# Patient Record
Sex: FEMALE | Race: WHITE | NOT HISPANIC OR LATINO | Employment: UNEMPLOYED | URBAN - METROPOLITAN AREA
[De-identification: names, ages, dates, MRNs, and addresses within clinical notes are randomized per-mention and may not be internally consistent; named-entity substitution may affect disease eponyms.]

---

## 2017-07-28 ENCOUNTER — HOSPITAL ENCOUNTER (OUTPATIENT)
Dept: FAMILY MEDICINE CLINIC | Facility: CLINIC | Age: 37
Setting detail: SPECIMEN
Discharge: HOME | End: 2017-07-28
Attending: NURSE PRACTITIONER | Admitting: NURSE PRACTITIONER

## 2017-07-28 LAB
CHROMATIN AB SERPL-ACNC: 24 [IU]/ML (ref 0–20)
CRP SERPL-MCNC: 0.1 MG/DL (ref 0–0.7)
ERYTHROCYTE [SEDIMENTATION RATE] IN BLOOD BY WESTERGREN METHOD: 23 MM/HR (ref 0–20)

## 2017-07-31 LAB
ANA SER QL IA: NORMAL

## 2018-01-10 ENCOUNTER — CONVERSION ENCOUNTER (OUTPATIENT)
Dept: FAMILY MEDICINE CLINIC | Facility: CLINIC | Age: 38
End: 2018-01-10

## 2018-01-10 LAB
ALBUMIN SERPL-MCNC: 4.2 G/DL (ref 3.6–5.1)
ALBUMIN/GLOB SERPL: ABNORMAL {RATIO} (ref 1–2.5)
ALP SERPL-CCNC: 78 UNITS/L (ref 33–115)
ALT SERPL-CCNC: 12 UNITS/L (ref 6–29)
AST SERPL-CCNC: 15 UNITS/L (ref 10–30)
BASOPHILS # BLD AUTO: ABNORMAL 10*3/MM3 (ref 0–200)
BASOPHILS NFR BLD AUTO: 0.7 %
BILIRUB SERPL-MCNC: 0.5 MG/DL (ref 0.2–1.2)
BILIRUB UR QL STRIP: NEGATIVE
BUN SERPL-MCNC: 18 MG/DL (ref 7–25)
BUN/CREAT SERPL: ABNORMAL (ref 6–22)
CALCIUM SERPL-MCNC: 9.3 MG/DL (ref 8.6–10.2)
CHLORIDE SERPL-SCNC: 105 MMOL/L (ref 98–110)
CO2 CONTENT VENOUS: 31 MMOL/L (ref 20–31)
COLOR UR: YELLOW
CONV BACTERIA IN URINE MICRO: ABNORMAL /HPF
CONV COMMENT: 0.02
CONV HYALINE CASTS IN URINE MICRO: ABNORMAL
CONV NEUTROPHILS/100 LEUKOCYTES IN BODY FLUID BY MANUAL COUNT: 59.8 %
CONV PROTEIN IN URINE BY AUTOMATED TEST STRIP: NEGATIVE
CONV TOTAL PROTEIN: 6.8 G/DL (ref 6.1–8.1)
CREAT UR-MCNC: 0.63 MG/DL (ref 0.5–1.1)
CRP SERPL-MCNC: 0.04 MG/DL
EOSINOPHIL # BLD AUTO: 1.4 %
EOSINOPHIL # BLD AUTO: ABNORMAL 10*3/MM3 (ref 15–500)
ERYTHROCYTE [DISTWIDTH] IN BLOOD BY AUTOMATED COUNT: 12.2 % (ref 11–15)
ERYTHROCYTE [SEDIMENTATION RATE] IN BLOOD BY WESTERGREN METHOD: 6 MM/HR
GLOBULIN UR ELPH-MCNC: ABNORMAL G/DL (ref 1.9–3.7)
GLUCOSE SERPL-MCNC: 72 MG/DL (ref 65–99)
GLUCOSE UR QL: NEGATIVE G/DL
HBV CORE AB SER DONR QL IA: ABNORMAL
HBV CORE AB SER DONR QL IA: REACTIVE
HBV SURFACE AB SER QL: ABNORMAL
HBV SURFACE AG SERPL QL IA: ABNORMAL
HCT VFR BLD AUTO: 35.1 % (ref 35–45)
HCV AB SER DONR QL: ABNORMAL
HGB BLD-MCNC: 12.4 G/DL (ref 11.7–15.5)
HGB UR QL STRIP: NEGATIVE
KETONES UR QL STRIP: NEGATIVE
LEUKOCYTE ESTERASE UR QL STRIP: NEGATIVE
LYMPHOCYTES # BLD AUTO: ABNORMAL 10*3/MM3 (ref 850–3900)
LYMPHOCYTES NFR BLD AUTO: 29.2 %
MCH RBC QN AUTO: 33.8 PG (ref 27–33)
MCHC RBC AUTO-ENTMCNC: ABNORMAL % (ref 32–36)
MCV RBC AUTO: 95.6 FL (ref 80–100)
MONOCYTES # BLD AUTO: ABNORMAL 10*3/MICROLITER (ref 200–950)
MONOCYTES NFR BLD AUTO: 8.9 %
NEUTROPHILS # BLD AUTO: ABNORMAL 10*3/MM3 (ref 1500–7800)
NITRITE UR QL STRIP: NEGATIVE
PH UR STRIP.AUTO: 8 [PH] (ref 5–8)
PLATELET # BLD AUTO: ABNORMAL 10*3/MM3 (ref 140–400)
PMV BLD AUTO: 11.1 FL (ref 7.5–12.5)
POTASSIUM SERPL-SCNC: 4.1 MMOL/L (ref 3.5–5.3)
RBC # BLD AUTO: ABNORMAL 10*6/MM3 (ref 3.8–5.1)
RBC #/AREA URNS HPF: ABNORMAL /[HPF]
SODIUM SERPL-SCNC: 139 MMOL/L (ref 135–146)
SP GR UR: 1.02 (ref 1–1.03)
SQUAMOUS #/AREA URNS HPF: ABNORMAL /HPF
WBC # BLD AUTO: ABNORMAL K/UL (ref 3.8–10.8)
WBC #/AREA URNS HPF: ABNORMAL CELLS/HPF

## 2018-01-31 LAB — HAV IGM SERPL QL IA: NORMAL

## 2018-08-29 LAB
ALBUMIN SERPL-MCNC: 4.2 G/DL (ref 3.6–5.1)
ALBUMIN/GLOB SERPL: ABNORMAL {RATIO} (ref 1–2.5)
ALP SERPL-CCNC: 76 UNITS/L (ref 33–115)
ALT SERPL-CCNC: 10 UNITS/L (ref 6–29)
AST SERPL-CCNC: 16 UNITS/L (ref 10–30)
BASOPHILS # BLD AUTO: ABNORMAL 10*3/MM3 (ref 0–200)
BASOPHILS NFR BLD AUTO: 0.7 %
BILIRUB SERPL-MCNC: 0.6 MG/DL (ref 0.2–1.2)
BILIRUB UR QL STRIP: NEGATIVE
BUN SERPL-MCNC: 12 MG/DL (ref 7–25)
BUN/CREAT SERPL: ABNORMAL (ref 6–22)
CALCIUM SERPL-MCNC: 9.4 MG/DL (ref 8.6–10.2)
CHLORIDE SERPL-SCNC: 103 MMOL/L (ref 98–110)
CO2 CONTENT VENOUS: 32 MMOL/L (ref 20–32)
COLOR UR: YELLOW
CONV BACTERIA IN URINE MICRO: ABNORMAL /HPF
CONV HYALINE CASTS IN URINE MICRO: ABNORMAL
CONV NEUTROPHILS/100 LEUKOCYTES IN BODY FLUID BY MANUAL COUNT: 64.9 %
CONV PROTEIN IN URINE BY AUTOMATED TEST STRIP: NEGATIVE
CONV TOTAL PROTEIN: 7.1 G/DL (ref 6.1–8.1)
CREAT UR-MCNC: 0.61 MG/DL (ref 0.5–1.1)
CRP SERPL-MCNC: 0.04 MG/DL
EOSINOPHIL # BLD AUTO: 2.6 %
EOSINOPHIL # BLD AUTO: ABNORMAL 10*3/MM3 (ref 15–500)
ERYTHROCYTE [DISTWIDTH] IN BLOOD BY AUTOMATED COUNT: 12.6 % (ref 11–15)
ERYTHROCYTE [SEDIMENTATION RATE] IN BLOOD BY WESTERGREN METHOD: 22 MM/HR
GLOBULIN UR ELPH-MCNC: ABNORMAL G/DL (ref 1.9–3.7)
GLUCOSE SERPL-MCNC: 74 MG/DL (ref 65–99)
GLUCOSE UR QL: NEGATIVE G/DL
HCT VFR BLD AUTO: 37.9 % (ref 35–45)
HGB BLD-MCNC: 12.7 G/DL (ref 11.7–15.5)
HGB UR QL STRIP: NEGATIVE
KETONES UR QL STRIP: NEGATIVE
LEUKOCYTE ESTERASE UR QL STRIP: NEGATIVE
LYMPHOCYTES # BLD AUTO: ABNORMAL 10*3/MM3 (ref 850–3900)
LYMPHOCYTES NFR BLD AUTO: 25.6 %
MCH RBC QN AUTO: 32.8 PG (ref 27–33)
MCHC RBC AUTO-ENTMCNC: ABNORMAL % (ref 32–36)
MCV RBC AUTO: 97.9 FL (ref 80–100)
MONOCYTES # BLD AUTO: ABNORMAL 10*3/MICROLITER (ref 200–950)
MONOCYTES NFR BLD AUTO: 6.2 %
NEUTROPHILS # BLD AUTO: ABNORMAL 10*3/MM3 (ref 1500–7800)
NITRITE UR QL STRIP: NEGATIVE
PH UR STRIP.AUTO: 7.5 [PH] (ref 5–8)
PLATELET # BLD AUTO: ABNORMAL 10*3/MM3 (ref 140–400)
PMV BLD AUTO: 11.1 FL (ref 7.5–12.5)
POTASSIUM SERPL-SCNC: 3.9 MMOL/L (ref 3.5–5.3)
RBC # BLD AUTO: ABNORMAL 10*6/MM3 (ref 3.8–5.1)
RBC #/AREA URNS HPF: ABNORMAL /[HPF]
SODIUM SERPL-SCNC: 138 MMOL/L (ref 135–146)
SP GR UR: 1 (ref 1–1.03)
SQUAMOUS #/AREA URNS HPF: ABNORMAL /HPF
WBC # BLD AUTO: ABNORMAL K/UL (ref 3.8–10.8)
WBC #/AREA URNS HPF: ABNORMAL CELLS/HPF

## 2018-10-15 ENCOUNTER — HOSPITAL ENCOUNTER (OUTPATIENT)
Dept: FAMILY MEDICINE CLINIC | Facility: CLINIC | Age: 38
Setting detail: SPECIMEN
Discharge: HOME | End: 2018-10-15
Attending: FAMILY MEDICINE | Admitting: FAMILY MEDICINE

## 2018-10-15 LAB
T4 FREE SERPL-MCNC: 0.75 NG/DL (ref 0.58–1.64)
TSH SERPL-ACNC: 1.49 UIU/ML (ref 0.34–5.6)

## 2018-12-04 LAB
ALBUMIN SERPL-MCNC: 4.2 G/DL (ref 3.6–5.1)
ALBUMIN/GLOB SERPL: ABNORMAL {RATIO} (ref 1–2.5)
ALP SERPL-CCNC: 57 UNITS/L (ref 33–115)
ALT SERPL-CCNC: 11 UNITS/L (ref 6–29)
AST SERPL-CCNC: 16 UNITS/L (ref 10–30)
BASOPHILS # BLD AUTO: ABNORMAL 10*3/MM3 (ref 0–200)
BASOPHILS NFR BLD AUTO: 0.8 %
BILIRUB SERPL-MCNC: 0.7 MG/DL (ref 0.2–1.2)
BILIRUB UR QL STRIP: NEGATIVE
BUN SERPL-MCNC: 14 MG/DL (ref 7–25)
BUN/CREAT SERPL: ABNORMAL (ref 6–22)
CALCIUM SERPL-MCNC: 8.9 MG/DL (ref 8.6–10.2)
CHLORIDE SERPL-SCNC: 106 MMOL/L (ref 98–110)
CO2 CONTENT VENOUS: 24 MMOL/L (ref 20–32)
COLOR UR: YELLOW
CONV BACTERIA IN URINE MICRO: ABNORMAL /HPF
CONV HYALINE CASTS IN URINE MICRO: ABNORMAL
CONV NEUTROPHILS/100 LEUKOCYTES IN BODY FLUID BY MANUAL COUNT: 54.6 %
CONV PROTEIN IN URINE BY AUTOMATED TEST STRIP: NEGATIVE
CONV TOTAL PROTEIN: 6.7 G/DL (ref 6.1–8.1)
CREAT UR-MCNC: 0.63 MG/DL (ref 0.5–1.1)
CRP SERPL-MCNC: 0.02 MG/DL
EOSINOPHIL # BLD AUTO: 4 %
EOSINOPHIL # BLD AUTO: ABNORMAL 10*3/MM3 (ref 15–500)
ERYTHROCYTE [DISTWIDTH] IN BLOOD BY AUTOMATED COUNT: 12 % (ref 11–15)
ERYTHROCYTE [SEDIMENTATION RATE] IN BLOOD BY WESTERGREN METHOD: 6 MM/HR
GLOBULIN UR ELPH-MCNC: ABNORMAL G/DL (ref 1.9–3.7)
GLUCOSE SERPL-MCNC: 79 MG/DL (ref 65–139)
GLUCOSE UR QL: NEGATIVE G/DL
HCT VFR BLD AUTO: 33.8 % (ref 35–45)
HGB BLD-MCNC: 11.7 G/DL (ref 11.7–15.5)
HGB UR QL STRIP: NEGATIVE
KETONES UR QL STRIP: NEGATIVE
LEUKOCYTE ESTERASE UR QL STRIP: NEGATIVE
LYMPHOCYTES # BLD AUTO: ABNORMAL 10*3/MM3 (ref 850–3900)
LYMPHOCYTES NFR BLD AUTO: 34.2 %
MCH RBC QN AUTO: 32.8 PG (ref 27–33)
MCHC RBC AUTO-ENTMCNC: ABNORMAL % (ref 32–36)
MCV RBC AUTO: 94.7 FL (ref 80–100)
MONOCYTES # BLD AUTO: ABNORMAL 10*3/MICROLITER (ref 200–950)
MONOCYTES NFR BLD AUTO: 6.4 %
NEUTROPHILS # BLD AUTO: ABNORMAL 10*3/MM3 (ref 1500–7800)
NITRITE UR QL STRIP: NEGATIVE
PH UR STRIP.AUTO: 7 [PH] (ref 5–8)
PLATELET # BLD AUTO: ABNORMAL 10*3/MM3 (ref 140–400)
PMV BLD AUTO: 11.8 FL (ref 7.5–12.5)
POTASSIUM SERPL-SCNC: 3.9 MMOL/L (ref 3.5–5.3)
RBC # BLD AUTO: ABNORMAL 10*6/MM3 (ref 3.8–5.1)
RBC #/AREA URNS HPF: ABNORMAL /[HPF]
SODIUM SERPL-SCNC: 139 MMOL/L (ref 135–146)
SP GR UR: 1.01 (ref 1–1.03)
SQUAMOUS #/AREA URNS HPF: ABNORMAL /HPF
WBC # BLD AUTO: ABNORMAL K/UL (ref 3.8–10.8)
WBC #/AREA URNS HPF: ABNORMAL CELLS/HPF

## 2018-12-13 ENCOUNTER — HOSPITAL ENCOUNTER (OUTPATIENT)
Dept: OTHER | Facility: HOSPITAL | Age: 38
Setting detail: SPECIMEN
Discharge: HOME | End: 2018-12-13
Attending: SURGERY | Admitting: SURGERY

## 2019-01-16 ENCOUNTER — HOSPITAL ENCOUNTER (OUTPATIENT)
Dept: FAMILY MEDICINE CLINIC | Facility: CLINIC | Age: 39
Setting detail: SPECIMEN
Discharge: HOME | End: 2019-01-16
Attending: FAMILY MEDICINE | Admitting: FAMILY MEDICINE

## 2019-01-17 LAB
FOLATE SERPL-MCNC: 17.2 NG/ML
VIT B12 SERPL-MCNC: 895 PG/ML (ref 200–1100)

## 2019-03-08 ENCOUNTER — HOSPITAL ENCOUNTER (OUTPATIENT)
Dept: LAB | Facility: HOSPITAL | Age: 39
Setting detail: SPECIMEN
Discharge: HOME | End: 2019-03-08
Attending: INTERNAL MEDICINE | Admitting: INTERNAL MEDICINE

## 2019-04-11 ENCOUNTER — HOSPITAL ENCOUNTER (OUTPATIENT)
Dept: LAB | Facility: HOSPITAL | Age: 39
Discharge: HOME | End: 2019-04-11
Attending: OBSTETRICS & GYNECOLOGY | Admitting: OBSTETRICS & GYNECOLOGY

## 2019-04-11 LAB
ABO + RH BLD: NORMAL
ALBUMIN SERPL-MCNC: 3.9 G/DL (ref 3.5–4.8)
ALBUMIN/GLOB SERPL: 1.6 {RATIO} (ref 1–1.7)
ALP SERPL-CCNC: 57 IU/L (ref 32–91)
ALT SERPL-CCNC: 14 IU/L (ref 14–54)
ANION GAP SERPL CALC-SCNC: 12.7 MMOL/L (ref 10–20)
ARMBAND: NORMAL
AST SERPL-CCNC: 19 IU/L (ref 15–41)
BASOPHILS # BLD AUTO: 0.1 10*3/UL (ref 0–0.2)
BASOPHILS NFR BLD AUTO: 1 % (ref 0–2)
BILIRUB SERPL-MCNC: 1 MG/DL (ref 0.3–1.2)
BLD COMPONENT TYPE: NORMAL
BLD GP AB SCN SERPL QL: NEGATIVE
BUN SERPL-MCNC: 11 MG/DL (ref 8–20)
BUN/CREAT SERPL: 12.2 (ref 5.4–26.2)
CALCIUM SERPL-MCNC: 9.1 MG/DL (ref 8.9–10.3)
CHLORIDE SERPL-SCNC: 100 MMOL/L (ref 101–111)
CONV CO2: 26 MMOL/L (ref 22–32)
CONV TOTAL PROTEIN: 6.4 G/DL (ref 6.1–7.9)
CREAT UR-MCNC: 0.9 MG/DL (ref 0.4–1)
CROSSMATCH EXPIRATION: NORMAL
DIFFERENTIAL METHOD BLD: (no result)
EOSINOPHIL # BLD AUTO: 0.1 10*3/UL (ref 0–0.3)
EOSINOPHIL # BLD AUTO: 2 % (ref 0–3)
ERYTHROCYTE [DISTWIDTH] IN BLOOD BY AUTOMATED COUNT: 13.7 % (ref 11.5–14.5)
GLOBULIN UR ELPH-MCNC: 2.5 G/DL (ref 2.5–3.8)
GLUCOSE SERPL-MCNC: 74 MG/DL (ref 65–99)
HCT VFR BLD AUTO: 35.6 % (ref 35–49)
HGB BLD-MCNC: 12.2 G/DL (ref 12–15)
LYMPHOCYTES # BLD AUTO: 1.9 10*3/UL (ref 0.8–4.8)
LYMPHOCYTES NFR BLD AUTO: 31 % (ref 18–42)
MCH RBC QN AUTO: 33.8 PG (ref 26–32)
MCHC RBC AUTO-ENTMCNC: 34.2 G/DL (ref 32–36)
MCV RBC AUTO: 98.7 FL (ref 80–94)
MONOCYTES # BLD AUTO: 0.6 10*3/UL (ref 0.1–1.3)
MONOCYTES NFR BLD AUTO: 10 % (ref 2–11)
NEUTROPHILS # BLD AUTO: 3.4 10*3/UL (ref 2.3–8.6)
NEUTROPHILS NFR BLD AUTO: 56 % (ref 50–75)
NRBC BLD AUTO-RTO: 0 /100{WBCS}
NRBC/RBC NFR BLD MANUAL: 0 10*3/UL
PLATELET # BLD AUTO: 186 10*3/UL (ref 150–450)
PMV BLD AUTO: 9.4 FL (ref 7.4–10.4)
POTASSIUM SERPL-SCNC: 3.7 MMOL/L (ref 3.6–5.1)
RBC # BLD AUTO: 3.6 10*6/UL (ref 4–5.4)
SODIUM SERPL-SCNC: 135 MMOL/L (ref 136–144)
WBC # BLD AUTO: 6.1 10*3/UL (ref 4.5–11.5)

## 2019-04-18 ENCOUNTER — HOSPITAL ENCOUNTER (OUTPATIENT)
Dept: OTHER | Facility: HOSPITAL | Age: 39
Setting detail: SPECIMEN
Discharge: HOME | End: 2019-04-18
Attending: OBSTETRICS & GYNECOLOGY | Admitting: OBSTETRICS & GYNECOLOGY

## 2019-04-19 ENCOUNTER — HOSPITAL ENCOUNTER (OUTPATIENT)
Dept: OTHER | Facility: HOSPITAL | Age: 39
Setting detail: SPECIMEN
Discharge: HOME | End: 2019-04-19
Attending: OBSTETRICS & GYNECOLOGY | Admitting: OBSTETRICS & GYNECOLOGY

## 2019-04-19 LAB
BASOPHILS # BLD AUTO: 0 10*3/UL (ref 0–0.2)
BASOPHILS NFR BLD AUTO: 0 % (ref 0–2)
DIFFERENTIAL METHOD BLD: (no result)
EOSINOPHIL # BLD AUTO: 0.1 10*3/UL (ref 0–0.3)
EOSINOPHIL # BLD AUTO: 1 % (ref 0–3)
ERYTHROCYTE [DISTWIDTH] IN BLOOD BY AUTOMATED COUNT: 13.6 % (ref 11.5–14.5)
HCT VFR BLD AUTO: 33.4 % (ref 35–49)
HGB BLD-MCNC: 11.6 G/DL (ref 12–15)
LYMPHOCYTES # BLD AUTO: 2.3 10*3/UL (ref 0.8–4.8)
LYMPHOCYTES NFR BLD AUTO: 23 % (ref 18–42)
MCH RBC QN AUTO: 34.5 PG (ref 26–32)
MCHC RBC AUTO-ENTMCNC: 34.8 G/DL (ref 32–36)
MCV RBC AUTO: 99.1 FL (ref 80–94)
MONOCYTES # BLD AUTO: 0.7 10*3/UL (ref 0.1–1.3)
MONOCYTES NFR BLD AUTO: 7 % (ref 2–11)
NEUTROPHILS # BLD AUTO: 7 10*3/UL (ref 2.3–8.6)
NEUTROPHILS NFR BLD AUTO: 69 % (ref 50–75)
NRBC BLD AUTO-RTO: 0 /100{WBCS}
NRBC/RBC NFR BLD MANUAL: 0 10*3/UL
PLATELET # BLD AUTO: 122 10*3/UL (ref 150–450)
PMV BLD AUTO: 10 FL (ref 7.4–10.4)
RBC # BLD AUTO: 3.37 10*6/UL (ref 4–5.4)
WBC # BLD AUTO: 10.2 10*3/UL (ref 4.5–11.5)

## 2019-04-26 ENCOUNTER — HOSPITAL ENCOUNTER (OUTPATIENT)
Dept: OTHER | Facility: HOSPITAL | Age: 39
Discharge: HOME | End: 2019-04-26
Attending: NURSE PRACTITIONER | Admitting: NURSE PRACTITIONER

## 2019-05-21 ENCOUNTER — CONVERSION ENCOUNTER (OUTPATIENT)
Dept: FAMILY MEDICINE CLINIC | Facility: CLINIC | Age: 39
End: 2019-05-21

## 2019-05-21 LAB
C3 SERPL-MCNC: 95 MG/DL (ref 83–193)
C4 SERPL-MCNC: 17 MG/DL (ref 15–57)

## 2019-06-03 ENCOUNTER — CONVERSION ENCOUNTER (OUTPATIENT)
Dept: FAMILY MEDICINE CLINIC | Facility: CLINIC | Age: 39
End: 2019-06-03

## 2019-06-04 VITALS
HEART RATE: 67 BPM | WEIGHT: 143 LBS | HEIGHT: 69 IN | DIASTOLIC BLOOD PRESSURE: 66 MMHG | SYSTOLIC BLOOD PRESSURE: 101 MMHG | BODY MASS INDEX: 21.18 KG/M2

## 2019-06-06 NOTE — PROGRESS NOTES
Visit Type:  Follow-up Visit  Referring Provider:  Lilo Nettles MD  Primary Provider:  Yoon CARRERA MD    CC:  wrist pain.    History of Present Illness:    The patient is a 38-year-old female with CTD coming today in follow-up.  She was last in the office December 2018, that time, the patient was recommended to restart Plaquenil 200 mg p.o. daily.  She did not follow this recommendation, she says that she   has not been  comparable taking medications.    On today's visit, she reports pain that is rated 2/10, that is present in her Rt.  Wrist, she does not have major morning stiffness, she continues to have dry eyes and dry mouth, she has sores in her mouth and sometimes in her nose on occasion.  She has   skin rashes especially in her anterior upper chest after sun exposure, they do not last long.  Denies major episodes of Raynaud's.  She has occasional tenderness in the  Rt. pre-auricular area  On and off that is associated with stress. She had dysuria,   this morning she was seen by her OBGYN doctor and had a UC, there were no signs of urinary tract infection as per day UA.    Since last time I saw her, she had a hysterectomy for management of adenomyosis.  He hospital couse was complicated with superficial venous thrombosis on the LFUE.  I recommended to her to use aspirin due to her (+) anti phosphatase prothrombin IgM that   was double the upper limit of normal.  Initially we discussed about  using Lovenox she was not agreeble. She did not follow recommendations.      her laboratories today show normal Cr, liver function tests, normal complement levels, ESR 2, UA with (+) for occult blood and (+) for leukocyte esterase, had another UC on repeated at her OBGYN so office, CBC normal, CRP 0.7.            Rheumatologic history:  1. (+) RF, arthralgias  Vectra DA 27 01/2018  Ultrasound of the hands  01/2018 (-) for active inflammation    2. CTD based on  LUCAS 1:320 (Had strong postive antithyroglobulin)  with homogeneous pattern, Raynuds, oral nasal ulcers, photosensitive rash, (+) RF, srthalgias with no arthritis.  Plaquenil recommended 9/18, pt preferred to defer.    3. CTS bilaterally    4. Raynaud's    5. 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   superficial  thrombosis of the Rt.  Upper extremity after hysterectomy          Past Medical History:     Reviewed history from 03/08/2019 and no changes required:        Anemia        depression        thyroid                 tdap-2016        pap-2016    Past Surgical History:     Reviewed history from 03/08/2019 and no changes required:        HERNIA         3 - C-SECTIONS        bilateral tubal ligation-2016        Laproscopic hernia repair         2 foot Surgery        Breast Incisonal Biopsy     Family History Summary:      Reviewed history Last on 03/08/2019 and no changes required:06/03/2019      General Comments - FH:  FH MOTHER / PASSED AWAY WHEN SHE WAS YOUNG   FH CIRRHOSA OF THE LIVER / FATHER  ALCHOL  FH Heart Diosease Gradfather   FH Stroke Gradnmotjher   FH Cholesterol Grandmother  FH Cholesterol Grandmother   FH Thyroid gradnmother / Aunt  FH Cancer Uncle       Social History:     Reviewed history from 03/08/2019 and no changes required:        Patient is a former smoker.        Passive Smoke: N        Alcohol Use: Y        Drug Use: N        HIV/High Risk: N        Regular Exercise: N        Hx Domestic Abuse: N        Confucianist Affecting Care: N            Social History Summary:  Patient is a former smoker.  Passive Smoke: N  Alcohol Use: Y  Drug Use: N  HIV/High Risk: N  Regular Exercise: N  Hx Domestic Abuse: N  Confucianist Affecting Care: N  Social History Reviewed: 06/03/2019          Risk Factors:     Smoked Tobacco Use:  Former smoker     Cigarettes:  Yes  Smokeless Tobacco Use:  Never     Tobacco Use Comments:  QUIT 10 YEARS AGO  Passive smoke exposure:  no  Drug use:  no  HIV high-risk behavior:  no  Caffeine use:   1 drinks per day  Alcohol use:  yes     Drinks per day:  social     Counseled to quit/cut down alcohol use:  yes  Exercise:  no  Seatbelt use:  100 %  Sun Exposure:  occasionally    Previous Tobacco Use: Signed On - 03/08/2019  Smoked Tobacco Use:  Former smoker     Cigarettes:  Yes  Smokeless Tobacco Use:  Never     Tobacco Use Comments:  QUIT 10 YEARS AGO  Passive smoke exposure:  no  Drug use:  no  HIV high-risk behavior:  no  Caffeine use:  1 drinks per day    Previous Alcohol Use: Signed On - 03/08/2019  Alcohol use:  yes     Drinks per day:  social     Counseled to quit/cut down alcohol use:  yes  Exercise:  no  Seatbelt use:  100 %  Sun Exposure:  occasionally    Mammogram History:     Date of Last Mammogram:  07/03/2017    PAP Smear History:     Date of Last PAP Smear:  01/01/2018    Active Medications (reviewed today):  HYDROXYZINE HCL 25 MG ORAL TABLET (HYDROXYZINE HCL) 1 tab q6h prn anxiety  VISTARIL 25 MG ORAL CAPSULE (HYDROXYZINE PAMOATE) 1 tab q6h prn anxiety  ARMOUR THYROID 15 MG ORAL TABLET (THYROID) take 1 tablet by mouth daily  LEXAPRO 20 MG ORAL TABLET (ESCITALOPRAM OXALATE) 1 tab daily  CYCLOBENZAPRINE HCL 5 MG ORAL TABLET (CYCLOBENZAPRINE HCL) 1-2 tabs at night prn  ADDERALL 10 MG ORAL TABLET (AMPHETAMINE-DEXTROAMPHETAMINE) 1 tab bid  BUSPIRONE HCL 10 MG TABLET (BUSPIRONE HCL) TAKE 1 TABLET BY MOUTH EVERY 6 HOURS AS NEEDED FOR ANXIETY  RHINOCORT ALLERGY 32 MCG/ACT NASAL SUSPENSION (BUDESONIDE) as needed  MULTIVITAMINS TABS (MULTIPLE VITAMIN) Take one by mouth daily    Current Allergies (reviewed today):  AMOXICILLIN (Critical)  * LEVAQUIN (Moderate)    Current Medications (including medications started today):   HYDROXYZINE HCL 25 MG ORAL TABLET (HYDROXYZINE HCL) 1 tab q6h prn anxiety  VISTARIL 25 MG ORAL CAPSULE (HYDROXYZINE PAMOATE) 1 tab q6h prn anxiety  ARMOUR THYROID 15 MG ORAL TABLET (THYROID) take 1 tablet by mouth daily  LEXAPRO 20 MG ORAL TABLET (ESCITALOPRAM OXALATE) 1 tab  daily  CYCLOBENZAPRINE HCL 5 MG ORAL TABLET (CYCLOBENZAPRINE HCL) 1-2 tabs at night prn  ADDERALL 10 MG ORAL TABLET (AMPHETAMINE-DEXTROAMPHETAMINE) 1 tab bid  BUSPIRONE HCL 10 MG TABLET (BUSPIRONE HCL) TAKE 1 TABLET BY MOUTH EVERY 6 HOURS AS NEEDED FOR ANXIETY  RHINOCORT ALLERGY 32 MCG/ACT NASAL SUSPENSION (BUDESONIDE) as needed  MULTIVITAMINS TABS (MULTIPLE VITAMIN) Take one by mouth daily      Review of Systems        See HPI      Vital Signs:    Patient Profile:    38 Years Old Female  Height:     69 inches (175.26 cm)  Weight:     143 pounds (64.86 kg)  BMI:        21.12     BSA:        1.79  Pulse rate: 67 / minute  BP Sittin / 66  (left arm)    Cuff size:  regular  Patient has a risk of falls? No  Patient in pain?    Yes     Location:    wrist pain    Problems: Active problems were reviewed with the patient during this visit.  Medications: Medications were reviewed with the patient during this visit.  Allergies: Allergies were reviewed with the patient during this visit.        Vitals Entered By: Radha Timmons CMA ([place]  3, 2019 1:09 PM)      Physical Exam    General:      well developed, well nourished, in no acute distress.    Head:      normocephalic and atraumatic.    Eyes:      PERRL/EOM intact, conjunctiva and sclera clear with out nystagmus.    Nose:      no deformity, discharge, inflammation, or lesions.    Mouth:      no deformity or lesions.  Lungs:      clear bilaterally to auscultation.    Heart:      non-displaced PMI, chest non-tender; regular rate and rhythm, S1, S2 without murmurs, rubs, or gallops  Abdomen:      Soft, nontender, nondistended bowel sounds are present and normoactive.  Msk:      No major joint tenderness, no synovitis.  Skin:      intact without lesions or rashes.        Blood Pressure:  Today's BP: 101/66 mm Hg    Labwork:   Most Recent Lab Results:   LDL: 85 mg/dL 2014        Impression & Recommendations:    Problem # 1:  Connective tissue disease (ICD-710.9)  (ZIU39-T14.9)  Assessment: Unchanged    Patient with established diagnosis of CTD.  I have discussed extensively with the patient that she has multiple features of   UCTD including the strong (+) LUCAS that although it could be related to her a strong (+) antithyroglobulin, she also has   Raynaud's, has recurrent oral and nasal ulcers, she has photosensitive rash and complains of arthralgias and sicca symptoms.  In addition, she has antiphosphatidyl serine prothrombin IgM and her hysterectomy had recently been complicated with superficial   thrombosis in the Rt.  Upper extremity.  Today her physical exam is for the most part benign.  Her laboratories are unremarkable except for the UA.  We have discussed extensively regarding using Plaquenil, she prefers to defer for now.                  Cancer Screening:   COLONOSCOPY:  NO          PAP:    1/2018             MAMMOGRAM:     10/2018  Vaccines:          FLU:  NO               PNA:  NO               ZOSTER:  NO  Dexa Scan:   NO      Vitamin D/Calcium Supplement:     Multivitamin  X-rays:   CHEST:  NO          HANDS: 1/31/18  MSK Hands          FEET:  NO       Orders:  [facility] CBC W/DIFF; PATH REVIEW IF INDICATED (CBC)  [facility] COMPREHENSIVE METABOLIC PANEL (CMP) (MPC)  [facility] SEDIMENTATION RATE (ESR)  [facility] CRP C-REACTIVE PROTEIN INFLAMMATION (CRP)  Other - Please describe under instructions below (Other:)      Problem # 2:  Sjogren's syndrome (ICD-710.2) (KGO41-O57.00)   Systane eyedrops eyedrops, Biotene spray.    Problem # 3:  Raynaud's syndrome (ICD-443.0) (GLH63-G03.00)  Assessment: Unchanged   conservative measures recommended.    Problem # 4:  Rheumatoid factor, positive (ICD-790.99) (CXU33-M27.89)    No signs of active inflammatory arthropathy.    Other Orders:  [facility] URINALYSIS W/MICROSCOPIC (UAM)      Patient Instructions:  1)  Sun protective measures  2)  Blood pressure:  180/60  3)  Daily  exercises and healthy diet  4)  RTC in 6 months or sooner if  needed.                      Medication Administration    Orders Added:  1)  [facility] CBC W/DIFF; PATH REVIEW IF INDICATED [CBC]  2)  [facility] COMPREHENSIVE METABOLIC PANEL (CMP) [MPC]  3)  [facility] SEDIMENTATION RATE [ESR]  4)  [facility] CRP C-REACTIVE PROTEIN INFLAMMATION [CRP]  5)  [facility] URINALYSIS W/MICROSCOPIC [UAM]  6)  Other - Please describe under instructions below [Other:]  7)  Ofc Vst-Est Level IV [CPT-51917]  ]      Electronically signed by Lisa Shrestha MD on 06/03/2019 at 2:50 PM  ________________________________________________________________________       Disclaimer: Converted Note message may not contain all data elements that existed in the [employer] source system. Please see [employer] for the original note details.

## 2019-07-25 PROBLEM — M35.9 CONNECTIVE TISSUE DISEASE: Status: ACTIVE | Noted: 2018-12-11

## 2019-07-25 PROBLEM — M26.621 TMJ TENDERNESS, RIGHT: Status: ACTIVE | Noted: 2018-10-15

## 2019-07-25 PROBLEM — I73.00 RAYNAUD'S PHENOMENON: Status: ACTIVE | Noted: 2018-09-04

## 2019-07-25 PROBLEM — M25.50 ARTHRALGIA: Status: ACTIVE | Noted: 2018-01-10

## 2019-07-25 PROBLEM — R19.8 DISORDER OF ABDOMEN: Status: ACTIVE | Noted: 2018-01-12

## 2019-07-25 PROBLEM — M25.562 KNEE PAIN, LEFT: Status: ACTIVE | Noted: 2017-09-19

## 2019-07-25 PROBLEM — N92.6 IRREGULAR MENSTRUAL CYCLE: Status: ACTIVE | Noted: 2019-01-15

## 2019-07-25 PROBLEM — N95.1 HOT FLASHES DUE TO MENOPAUSE: Status: ACTIVE | Noted: 2019-02-04

## 2019-07-25 PROBLEM — M79.643 HAND PAIN: Status: ACTIVE | Noted: 2018-01-10

## 2019-07-25 PROBLEM — E55.9 VITAMIN D DEFICIENCY: Status: ACTIVE | Noted: 2018-01-10

## 2019-07-25 PROBLEM — R06.00 DYSPNEA: Status: ACTIVE | Noted: 2019-01-15

## 2019-07-25 PROBLEM — M35.00 SJOGREN'S SYNDROME (HCC): Status: ACTIVE | Noted: 2018-12-11

## 2019-07-25 PROBLEM — N39.0 URINARY TRACT INFECTION: Status: ACTIVE | Noted: 2018-01-10

## 2019-07-25 PROBLEM — R20.0 NUMBNESS OF TONGUE: Status: ACTIVE | Noted: 2018-01-10

## 2019-07-25 PROBLEM — R94.6 ABNORMAL FINDING ON THYROID FUNCTION TEST: Status: ACTIVE | Noted: 2018-10-15

## 2019-07-25 PROBLEM — R76.8 ELEVATED RHEUMATOID FACTOR: Status: ACTIVE | Noted: 2018-03-07

## 2019-07-25 PROBLEM — F98.8 ATTENTION DEFICIT DISORDER (ADD) WITHOUT HYPERACTIVITY: Status: ACTIVE | Noted: 2017-09-19

## 2019-07-25 PROBLEM — F43.20 ANACLITIC DEPRESSION: Status: ACTIVE | Noted: 2017-06-09

## 2019-07-25 PROBLEM — R89.9 ABNORMAL LABORATORY TEST: Status: ACTIVE | Noted: 2018-03-07

## 2019-07-25 PROBLEM — G56.03 CARPAL TUNNEL SYNDROME, BILATERAL UPPER LIMBS: Status: ACTIVE | Noted: 2018-01-10

## 2019-07-25 PROBLEM — J02.9 SORE THROAT: Status: ACTIVE | Noted: 2017-05-23

## 2019-07-25 PROBLEM — R76.8 ELEVATED ANTINUCLEAR ANTIBODY (ANA) LEVEL: Status: ACTIVE | Noted: 2018-09-04

## 2019-07-25 PROBLEM — D64.9 ANEMIA: Status: ACTIVE | Noted: 2019-07-25

## 2019-07-25 RX ORDER — CEPHALEXIN 500 MG/1
500 CAPSULE ORAL EVERY 12 HOURS
Refills: 0 | COMMUNITY
Start: 2019-04-26 | End: 2019-07-29

## 2019-07-25 RX ORDER — HYDROXYZINE PAMOATE 25 MG/1
CAPSULE ORAL
COMMUNITY
Start: 2019-04-10 | End: 2019-07-29

## 2019-07-25 RX ORDER — LEVOTHYROXINE AND LIOTHYRONINE 9.5; 2.25 UG/1; UG/1
TABLET ORAL EVERY 24 HOURS
COMMUNITY
Start: 2019-03-19 | End: 2020-04-30

## 2019-07-25 RX ORDER — IBUPROFEN 800 MG/1
800 TABLET ORAL EVERY 8 HOURS PRN
Refills: 0 | COMMUNITY
Start: 2019-04-18 | End: 2019-07-29

## 2019-07-25 RX ORDER — CYCLOBENZAPRINE HCL 5 MG
TABLET ORAL EVERY 24 HOURS
COMMUNITY
Start: 2018-10-15 | End: 2019-07-29

## 2019-07-25 RX ORDER — HYDROXYZINE HYDROCHLORIDE 25 MG/1
TABLET, FILM COATED ORAL
COMMUNITY
Start: 2019-04-17 | End: 2020-04-30

## 2019-07-25 RX ORDER — BUSPIRONE HYDROCHLORIDE 10 MG/1
TABLET ORAL EVERY 6 HOURS
COMMUNITY
Start: 2017-06-09 | End: 2020-04-30

## 2019-07-25 RX ORDER — ESCITALOPRAM OXALATE 20 MG/1
TABLET ORAL EVERY 24 HOURS
COMMUNITY
Start: 2018-11-19 | End: 2021-09-19

## 2019-07-25 RX ORDER — DEXTROAMPHETAMINE SACCHARATE, AMPHETAMINE ASPARTATE, DEXTROAMPHETAMINE SULFATE AND AMPHETAMINE SULFATE 2.5; 2.5; 2.5; 2.5 MG/1; MG/1; MG/1; MG/1
TABLET ORAL EVERY 12 HOURS
COMMUNITY
Start: 2017-09-19

## 2019-07-29 ENCOUNTER — OFFICE VISIT (OUTPATIENT)
Dept: ENDOCRINOLOGY | Facility: CLINIC | Age: 39
End: 2019-07-29

## 2019-07-29 VITALS
DIASTOLIC BLOOD PRESSURE: 65 MMHG | SYSTOLIC BLOOD PRESSURE: 100 MMHG | BODY MASS INDEX: 21.18 KG/M2 | HEIGHT: 69 IN | WEIGHT: 143 LBS | HEART RATE: 67 BPM | OXYGEN SATURATION: 97 %

## 2019-07-29 DIAGNOSIS — E06.3 HASHIMOTO'S THYROIDITIS: Primary | ICD-10-CM

## 2019-07-29 PROCEDURE — 99212 OFFICE O/P EST SF 10 MIN: CPT | Performed by: INTERNAL MEDICINE

## 2019-07-29 NOTE — PROGRESS NOTES
Endocrine Progress Note Outpatient     Patient Care Team:  Lilo Nettles MD as PCP - General  Lilo Nettles MD as PCP - Family Medicine    Chief Complaint: Follow up autoimmune thyroid disease    HPI: 89-year-old female with history of autoimmune thyroid disease, she was prescribed [place] Thyroid 15 mg May 2019.  She took it for about 2 weeks and went on vacation for 4 weeks to [place] and forgot to take her medications so she was off [place] Thyroid for 4 weeks.  She tells me that the 2 weeks that she took the medicine she felt like she had more energy and she felt better.  While in [place] 2 weeks into her vacation and she started having some GI symptoms and after she came back she resumed her [place] Thyroid at 15 mg.  She was total of 4 weeks.  She needs to have GI symptoms and her primary care physician thinks that this could be due to [place] Thyroid.  This time she is taking [place] Thyroid 15 mg p.o. daily.  Past Medical History:   Diagnosis Date   • Hypothyroidism    • Vitamin D deficiency        Social History     Socioeconomic History   • Marital status:      Spouse name: Not on file   • Number of children: Not on file   • Years of education: Not on file   • Highest education level: Not on file   Tobacco Use   • Smoking status: Never Smoker   Substance and Sexual Activity   • Alcohol use: Yes     Comment: social       Family History   Problem Relation Age of Onset   • Depression Mother    • Thyroid disease Mother    • Depression Father    • Irritable bowel syndrome Father    • Seizures Sister    • Anxiety disorder Sister    • Seizures Brother        Allergies   Allergen Reactions   • Amoxicillin Unknown (See Comments)   • Levofloxacin Rash       ROS:   Constitutional:  Denies fatigue, tiredness.    Eyes:  Denies change in visual acuity   HENT:  Denies nasal congestion or sore throat   Respiratory: denies cough, shortness of breath.   Cardiovascular:  denies chest  pain, edema   GI:  Denies abdominal pain, nausea, vomiting.   Musculoskeletal:  Denies back pain or joint pain   Integument:  Denies dry skin and rash   Neurologic:  Denies headache, focal weakness or sensory changes   Endocrine:  Denies polyuria or polydipsia   Psychiatric:  Denies depression or anxiety      Vitals:    07/29/19 1005   BP: 100/65   Pulse: 67   SpO2: 97%       Physical Exam:  GEN: NAD, conversant  EYES: EOMI, PERRL, no conjunctival erythema  NECK: no thyromegaly, full ROM   CV: RRR, no murmurs/rubs/gallops, no peripheral edema  LUNG: CTAB, no wheezes/rales/ronchi  SKIN: no rashes, no acanthosis  MSK: no deformities, full ROM of all extremities  NEURO: no tremors, DTR normal  PSYCH: AOX3, appropriate mood, affect normal      Results Review:     I reviewed the patient's new clinical results.      Lab Results   Component Value Date    GLUCOSE 74 04/11/2019    BUN 11 04/11/2019    CREATININE 0.9 04/11/2019    EGFRIFNONA 133 12/04/2018    EGFRIFAFRI 115 12/04/2018    BCR 12.2 04/11/2019    K 3.7 04/11/2019    CO2 26 04/11/2019    CALCIUM 9.1 04/11/2019    ALBUMIN 3.9 04/11/2019    LABIL2 1.6 04/11/2019    AST 19 04/11/2019    ALT 14 04/11/2019     Lab Results   Component Value Date    TSH 1.49 10/15/2018    FREET4 0.75 10/15/2018   Labs from July 24, 2019 showed a TG antibodies 197, TPO antibodies 3, TSH 2.4, free T4 0.9, total testosterone 18, free testosterone 0.8, sex hormone binding globulin is 99, albumin level 4.1, estradiol 310, LH 6.5 and FSH 5.7    Medication Review: Reviewed.       Current Outpatient Medications:   •  amphetamine-dextroamphetamine (ADDERALL) 10 MG tablet, Every 12 (Twelve) Hours., Disp: , Rfl:   •  budesonide (RHINOCORT ALLERGY) 32 MCG/ACT nasal spray, RHINOCORT ALLERGY 32 MCG/ACT SUSP, Disp: , Rfl:   •  busPIRone (BUSPAR) 10 MG tablet, Every 6 (Six) Hours., Disp: , Rfl:   •  escitalopram (LEXAPRO) 20 MG tablet, Daily., Disp: , Rfl:   •  hydrOXYzine (ATARAX) 25 MG tablet, ,  "Disp: , Rfl:   •  MULTIPLE VITAMIN PO, MULTIVITAMINS TABS, Disp: , Rfl:   •  thyroid (ARMOUR THYROID) 15 MG tablet, Daily., Disp: , Rfl:       Assessment/Plan   Hashimoto's thyroiditis: Currently on [place] Thyroid 15 mg p.o. daily.  I do not think her GI symptoms are related to [place] Thyroid.  She has decided to continue [place] Thyroid for now.  Likely she has improved with [place] Thyroid.  TG antibodies are now at 197 they were more than 700, her TSH is normal at 2.4 with free T4 of 0.9.  A total and free testosterone are normal as well.  At this time I will continue current dose of [place] Thyroid and follow her in 3 months with TSH and free T4.          Jens Peacock MD FACE.  06/15/19  4:34 PM      EMR Dragon / transcription disclaimer:     \"Dictated utilizing Dragon dictation\".                 "

## 2019-10-24 ENCOUNTER — OFFICE VISIT (OUTPATIENT)
Dept: ENDOCRINOLOGY | Facility: CLINIC | Age: 39
End: 2019-10-24

## 2019-10-24 VITALS
SYSTOLIC BLOOD PRESSURE: 100 MMHG | DIASTOLIC BLOOD PRESSURE: 65 MMHG | HEIGHT: 69 IN | OXYGEN SATURATION: 99 % | BODY MASS INDEX: 21.18 KG/M2 | HEART RATE: 68 BPM | WEIGHT: 143 LBS

## 2019-10-24 DIAGNOSIS — E55.9 VITAMIN D DEFICIENCY: ICD-10-CM

## 2019-10-24 DIAGNOSIS — E06.3 HASHIMOTO'S THYROIDITIS: Primary | ICD-10-CM

## 2019-10-24 PROCEDURE — 99213 OFFICE O/P EST LOW 20 MIN: CPT | Performed by: INTERNAL MEDICINE

## 2019-10-24 NOTE — PROGRESS NOTES
Endocrine Progress Note Outpatient     Patient Care Team:  Lilo Nettles MD as PCP - General  Lilo Nettles MD as PCP - Family Medicine    Chief Complaint: Follow-up Hashimoto's thyroiditis    HPI: 39-year-old female with history of Hashimoto's thyroiditis here for follow-up.  She is currently on [place] Thyroid 15 mg p.o. daily.  She tells me that seems like this has helped her she has more energy and clarity of her thoughts.  She is tolerating [place] Thyroid well.      Vitamin D deficiency: She is on vitamin D supplementation.    Past Medical History:   Diagnosis Date   • Hypothyroidism    • Vitamin D deficiency        Social History     Socioeconomic History   • Marital status:      Spouse name: Not on file   • Number of children: Not on file   • Years of education: Not on file   • Highest education level: Not on file   Tobacco Use   • Smoking status: Never Smoker   Substance and Sexual Activity   • Alcohol use: Yes     Comment: social       Family History   Problem Relation Age of Onset   • Depression Mother    • Thyroid disease Mother    • Depression Father    • Irritable bowel syndrome Father    • Seizures Sister    • Anxiety disorder Sister    • Seizures Brother        Allergies   Allergen Reactions   • Amoxicillin Unknown (See Comments)   • Levofloxacin Rash   • Shellfish-Derived Products Nausea And Vomiting       ROS:   Constitutional:  Denies fatigue, tiredness.    Eyes:  Denies change in visual acuity   HENT:  Denies nasal congestion or sore throat   Respiratory: denies cough, shortness of breath.   Cardiovascular:  denies chest pain, edema   GI:  Denies abdominal pain, nausea, vomiting.   Musculoskeletal:  Denies back pain or joint pain   Integument:  Denies dry skin and rash   Neurologic:  Denies headache, focal weakness or sensory changes   Endocrine:  Denies polyuria or polydipsia   Psychiatric:  Denies depression or anxiety      Vitals:    10/24/19 1122    BP: 100/65   Pulse: 68   SpO2: 99%       Physical Exam:  GEN: NAD, conversant  EYES: EOMI, PERRL, no conjunctival erythema  NECK: no thyromegaly, full ROM   CV: RRR, no murmurs/rubs/gallops, no peripheral edema  LUNG: CTAB, no wheezes/rales/ronchi  SKIN: no rashes, no acanthosis  MSK: no deformities, full ROM of all extremities  NEURO: no tremors, DTR normal  PSYCH: AOX3, appropriate mood, affect normal      Results Review:     I reviewed the patient's new clinical results.      Lab Results   Component Value Date    GLUCOSE 74 04/11/2019    BUN 11 04/11/2019    CREATININE 0.9 04/11/2019    EGFRIFNONA 133 12/04/2018    EGFRIFAFRI 115 12/04/2018    BCR 12.2 04/11/2019    K 3.7 04/11/2019    CO2 26 04/11/2019    CALCIUM 9.1 04/11/2019    ALBUMIN 3.9 04/11/2019    LABIL2 1.6 04/11/2019    AST 19 04/11/2019    ALT 14 04/11/2019     Lab Results   Component Value Date    TSH 1.49 10/15/2018    FREET4 0.75 10/15/2018     Labs from October 15, 2019 showed a TSH of 2.5 with free T3 2.8.    Medication Review: Reviewed.       Current Outpatient Medications:   •  amphetamine-dextroamphetamine (ADDERALL) 10 MG tablet, Every 12 (Twelve) Hours., Disp: , Rfl:   •  budesonide (RHINOCORT ALLERGY) 32 MCG/ACT nasal spray, RHINOCORT ALLERGY 32 MCG/ACT SUSP, Disp: , Rfl:   •  busPIRone (BUSPAR) 10 MG tablet, Every 6 (Six) Hours., Disp: , Rfl:   •  escitalopram (LEXAPRO) 20 MG tablet, Daily., Disp: , Rfl:   •  hydrOXYzine (ATARAX) 25 MG tablet, , Disp: , Rfl:   •  MULTIPLE VITAMIN PO, MULTIVITAMINS TABS, Disp: , Rfl:   •  thyroid (ARMOUR THYROID) 15 MG tablet, Daily., Disp: , Rfl:       Assessment/Plan   1.  Hashimoto's thyroiditis: Clinically doing very well.  [place] Thyroid 15 mg has helped her.  We will continue that and will follow labs.  2.  Vitamin D deficiency: She is currently on vitamin D supplementation.  Continue that.            Jens Peacock MD FACE.    Much of the above report is an electronic transcription/translation of  the spoken language to printed text using Dragon Software. As such, the subtleties and finesse of the spoken language may permit erroneous, or at times, nonsensical words or phrases to be inadvertently transcribed; thus changes may be made at a later date to rectify these errors.

## 2019-11-21 ENCOUNTER — TELEPHONE (OUTPATIENT)
Dept: RHEUMATOLOGY | Facility: CLINIC | Age: 39
End: 2019-11-21

## 2019-11-21 NOTE — TELEPHONE ENCOUNTER
She has an appointment on Dec. 9 with Dr Flynn. She can't make that appointment because she is going out of town. She doesn't want to wait until Feb to get back in with Dr Flynn. Can she see Aury?

## 2019-11-22 ENCOUNTER — LAB REQUISITION (OUTPATIENT)
Dept: LAB | Facility: HOSPITAL | Age: 39
End: 2019-11-22

## 2019-11-22 DIAGNOSIS — D89.89 OTHER SPECIFIED DISORDERS INVOLVING THE IMMUNE MECHANISM, NOT ELSEWHERE CLASSIFIED (HCC): ICD-10-CM

## 2019-11-22 PROCEDURE — 36415 COLL VENOUS BLD VENIPUNCTURE: CPT | Performed by: INTERNAL MEDICINE

## 2019-11-27 ENCOUNTER — TRANSCRIBE ORDERS (OUTPATIENT)
Dept: ADMINISTRATIVE | Facility: HOSPITAL | Age: 39
End: 2019-11-27

## 2019-11-27 DIAGNOSIS — N64.89 OTHER SPECIFIED DISORDERS OF BREAST: Primary | ICD-10-CM

## 2019-12-03 ENCOUNTER — OFFICE VISIT (OUTPATIENT)
Dept: RHEUMATOLOGY | Facility: CLINIC | Age: 39
End: 2019-12-03

## 2019-12-03 VITALS
WEIGHT: 141 LBS | SYSTOLIC BLOOD PRESSURE: 93 MMHG | BODY MASS INDEX: 20.82 KG/M2 | DIASTOLIC BLOOD PRESSURE: 62 MMHG | HEART RATE: 66 BPM

## 2019-12-03 DIAGNOSIS — M05.742 RHEUMATOID ARTHRITIS INVOLVING BOTH HANDS WITH POSITIVE RHEUMATOID FACTOR (HCC): ICD-10-CM

## 2019-12-03 DIAGNOSIS — I73.00 RAYNAUD'S PHENOMENON WITHOUT GANGRENE: ICD-10-CM

## 2019-12-03 DIAGNOSIS — M05.741 RHEUMATOID ARTHRITIS INVOLVING BOTH HANDS WITH POSITIVE RHEUMATOID FACTOR (HCC): ICD-10-CM

## 2019-12-03 DIAGNOSIS — M35.9 CONNECTIVE TISSUE DISEASE (HCC): Primary | ICD-10-CM

## 2019-12-03 PROCEDURE — 99213 OFFICE O/P EST LOW 20 MIN: CPT | Performed by: NURSE PRACTITIONER

## 2019-12-03 NOTE — PATIENT INSTRUCTIONS
She reports intermittent arthalgias, no joint swelling. Has episdoes of raynauds that she manages conservatively. Recommended to start plaquenil-pt deferred this and would rather not take any medications. We discussed her AVISE results and she reports having additional labs done however I do not have those results. Will check w/ [facility] if not in system I would like to add CBC, CMP, CRP & urinalysis for review. She is planning to move to [place] and I advised pt to be sure to establish care with Rheumatologist.  Will check [facility] on her labs.

## 2019-12-03 NOTE — PROGRESS NOTES
Subjective   Krystyna Diego is a 39 y.o. female.     History of Present Illness    The patient is a 39-year-old female with CTD coming today in follow-up.  She was last in the office in June 2019, that time, the patient was recommended to restart Plaquenil 200 mg p.o. daily.  She did not follow this recommendation, she says that she has not been applicable to taking medications as she has concerns with side effects & would rather try other non-pharmacological approaches.    AM stiffness: none in the morning. Will have some joint pain when eating a lot of bread and sugar. Pain in her fingers & wrists at times.  Avoid medications. Denies joint swelling. On today's visit she she denies any pain.     ROS: + dry eyes and dry mouth, she has sores in her mouth and sometimes in her nose on occasion.  She has skin rashes especially in her anterior upper chest after sun exposure, they do not last long.  Denies major episodes of Raynaud's.   She had a hysterectomy for management of adenomyosis.  Had hospital couse was complicated with superficial venous thrombosis on the LFUE.  I recommended to her to use aspirin due to her (+) anti phosphatase prothrombin IgM that was double the upper limit of normal.  Initially we discussed about  using Lovenox she was not agreeble. She did not follow recommendations.     Her recent AVISE done on 11-23-19 showed + LUCAS 1:320, speckled pattern. + Anti-thyroglobulin IgG, normal complement levels, + Anti-dsDNA IgG, weak positive anti-histone, anti-phosphatidylserine/Prothrombin IgM (-)    No other labs in the system at this time.      Rheumatologic history:  1. (+) RF, arthralgias  Vectra DA 27 01/2018  Ultrasound of the hands  01/2018 (-) for active inflammation    2. CTD based on  LUCAS 1:320 (Had strong postive antithyroglobulin) with homogeneous pattern, Raynuds, oral nasal ulcers, photosensitive rash, (+) RF, arthalgias with no arthritis.  Plaquenil recommended 9/18, pt preferred to  defer.    3. CTS bilaterally    4. Raynaud's-she manages w/ conservative measures.    5. 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   superficial  thrombosis of the Rt.  Upper extremity after hysterectomy              The following portions of the patient's history were reviewed and updated as appropriate: allergies, current medications, past family history, past medical history, past social history, past surgical history and problem list.    Review of Systems  See HPI    Objective   Physical Exam   Constitutional: She is oriented to person, place, and time. She appears well-developed and well-nourished.   HENT:   Head: Normocephalic.   Nose: Nose normal.   Eyes: Conjunctivae are normal. Pupils are equal, round, and reactive to light.   Cardiovascular: Normal rate and regular rhythm.   Pulmonary/Chest: Effort normal and breath sounds normal. She has no wheezes.   Musculoskeletal:   She has full ROM  She has mild tenderness over the bilateral 2nd MCPs and bilateral PIPs  No swelling is noted on examination.   Neurological: She is alert and oriented to person, place, and time.   Skin: Skin is warm and dry. No rash noted.   Psychiatric: She has a normal mood and affect.   Vitals reviewed.        Assessment/Plan   Krystyna was seen today for joint pain.    Diagnoses and all orders for this visit:    Connective tissue disease (CMS/[facility])  Comments:  She reports intermittent arthalgias, no joint swelling. Has episdoes of raynauds that she manages conservatively. Recommended to start plaquenil-pt deferred thi  Orders:  -     CBC & Differential; Future  -     Comprehensive Metabolic Panel; Future  -     C-reactive Protein; Future  -     Urinalysis With Culture If Indicated -; Future    Raynaud's phenomenon without gangrene  Comments:  Stable w/ conservative measures.    Rheumatoid arthritis involving both hands with positive rheumatoid factor (CMS/HCC)  Comments:  +RF, clinically no sign of  active disease. No synovitis on examination. Would recommend US of both hands to monitor. US 1/2018 was (-) for active inflammatio           Patient Instructions     She reports intermittent arthalgias, no joint swelling. Has episdoes of raynauds that she manages conservatively. Recommended to start plaquenil-pt deferred this and would rather not take any medications. We discussed her AVISE results and she reports having additional labs done however I do not have those results. Will check w/ [facility] if not in system I would like to add CBC, CMP, CRP & urinalysis for review. She is planning to move to [place] and I advised pt to be sure to establish care with Rheumatologist.  Will check w[facility] on her labs.

## 2019-12-26 ENCOUNTER — APPOINTMENT (OUTPATIENT)
Dept: MAMMOGRAPHY | Facility: HOSPITAL | Age: 39
End: 2019-12-26

## 2020-01-07 ENCOUNTER — TELEPHONE (OUTPATIENT)
Dept: RHEUMATOLOGY | Facility: CLINIC | Age: 40
End: 2020-01-07

## 2020-01-07 NOTE — TELEPHONE ENCOUNTER
Labs reviewed; her CBC showed no leukopenia, anemia or thrombocytopenia, urinalysis was (-) for blood and protein. Her ESR & CRP both normal.

## 2020-02-21 ENCOUNTER — OFFICE VISIT (OUTPATIENT)
Dept: SURGERY | Facility: CLINIC | Age: 40
End: 2020-02-21

## 2020-02-21 ENCOUNTER — TELEPHONE (OUTPATIENT)
Dept: SURGERY | Facility: CLINIC | Age: 40
End: 2020-02-21

## 2020-02-21 VITALS
HEART RATE: 60 BPM | SYSTOLIC BLOOD PRESSURE: 100 MMHG | BODY MASS INDEX: 21.18 KG/M2 | RESPIRATION RATE: 14 BRPM | DIASTOLIC BLOOD PRESSURE: 59 MMHG | OXYGEN SATURATION: 100 % | HEIGHT: 69 IN | WEIGHT: 143 LBS

## 2020-02-21 DIAGNOSIS — N60.02 BENIGN BREAST CYST IN FEMALE, LEFT: Primary | ICD-10-CM

## 2020-02-21 DIAGNOSIS — N60.02 BENIGN BREAST CYST IN FEMALE, LEFT: ICD-10-CM

## 2020-02-21 DIAGNOSIS — N64.4 BREAST PAIN: Primary | ICD-10-CM

## 2020-02-21 PROCEDURE — 99213 OFFICE O/P EST LOW 20 MIN: CPT | Performed by: SURGERY

## 2020-02-21 RX ORDER — ERGOCALCIFEROL 1.25 MG/1
1000 CAPSULE ORAL DAILY
COMMUNITY
Start: 2019-11-17 | End: 2021-04-30

## 2020-02-21 RX ORDER — HYDROXYZINE HYDROCHLORIDE 10 MG/1
10 TABLET, FILM COATED ORAL 2 TIMES DAILY
COMMUNITY
Start: 2019-12-16

## 2020-02-21 NOTE — PROGRESS NOTES
"Subjective   Krystyna Diego is a 39 y.o. female.   Chief Complaint   Patient presents with   • Breast Pain     /59 (BP Location: Left arm, Patient Position: Sitting, Cuff Size: Adult)   Pulse 60   Resp 14   Ht 175.3 cm (69\")   Wt 64.9 kg (143 lb)   SpO2 100%   BMI 21.12 kg/m²     HISTORY OF PRESENT ILLNESS:  Patient is known to me status post excisional biopsy of the right breast with benign pathology.  She also has a bloody nipple discharge which resolved.  Lately she has noted some pain and a small mass in the upper outer quadrant of her left breast.  Ultrasound was consistent with cluster of microcysts and six-month follow-up was recommended.  She reports that the breast pain is bilateral and worse with her period but present all the time and worse in the area of the microcyst formation.      The following portions of the patient's history were reviewed and updated as appropriate: allergies, current medications, past family history, past medical history, past social history, past surgical history and problem list.    Review of Systems    Objective   Physical Exam   Constitutional: She is oriented to person, place, and time. She appears well-developed and well-nourished.   HENT:   Head: Normocephalic and atraumatic.   Eyes: EOM are normal.   Neck: Normal range of motion.   Cardiovascular: Normal rate.   Pulmonary/Chest: Effort normal.   Breast with fibrocystic change bilaterally more significant in the upper outer quadrant of the left breast in the region of her most discomfort.  No nipple discharge.  Bilateral axilla negative.   Abdominal: Soft.   Musculoskeletal: Normal range of motion.   Neurological: She is alert and oriented to person, place, and time.   Skin: Skin is warm and dry.   Psychiatric: She has a normal mood and affect.         Assessment/Plan   Krystyna was seen today for breast pain.    Diagnoses and all orders for this visit:    Breast pain    Benign breast cyst in female, " left    I recommended that she stop all caffeine as she drinks one ice tea a day.  I have then counseled her to start vitamin E 400 IU daily.  If this does not alleviate her pain after a few weeks she can add an evening primrose oil.  I would also like to Peter ultrasound in 6 months and see her in follow-up after that    Dee Dee Sears MD  2/21/2020  11:59 AM

## 2020-02-21 NOTE — TELEPHONE ENCOUNTER
Patient presented with [facility] and [facility] insurance.  She is aware that [facility] requires referral which she does not have for todays visit. She is aware that without referral insurance may not pay. I suggested to pt that she still try and obtain referral for todays visit.

## 2020-04-20 ENCOUNTER — TELEPHONE (OUTPATIENT)
Dept: ENDOCRINOLOGY | Facility: CLINIC | Age: 40
End: 2020-04-20

## 2020-04-20 DIAGNOSIS — E06.3 HASHIMOTO'S THYROIDITIS: Primary | ICD-10-CM

## 2020-04-20 NOTE — TELEPHONE ENCOUNTER
Patient called and wanted to know if she can have thyroid antibodies test added to her lab orders she is going to get done. Please advise.

## 2020-04-21 ENCOUNTER — RESULTS ENCOUNTER (OUTPATIENT)
Dept: ENDOCRINOLOGY | Facility: CLINIC | Age: 40
End: 2020-04-21

## 2020-04-21 DIAGNOSIS — E06.3 HASHIMOTO'S THYROIDITIS: ICD-10-CM

## 2020-04-24 ENCOUNTER — LAB (OUTPATIENT)
Dept: LAB | Facility: HOSPITAL | Age: 40
End: 2020-04-24

## 2020-04-24 DIAGNOSIS — E55.9 VITAMIN D DEFICIENCY: ICD-10-CM

## 2020-04-24 DIAGNOSIS — E06.3 HASHIMOTO'S THYROIDITIS: ICD-10-CM

## 2020-04-24 LAB
ALBUMIN SERPL-MCNC: 4.3 G/DL (ref 3.5–5.2)
ALBUMIN/GLOB SERPL: 1.3 G/DL
ALP SERPL-CCNC: 61 U/L (ref 39–117)
ALT SERPL W P-5'-P-CCNC: 16 U/L (ref 1–33)
ANION GAP SERPL CALCULATED.3IONS-SCNC: 11 MMOL/L (ref 5–15)
AST SERPL-CCNC: 19 U/L (ref 1–32)
BILIRUB SERPL-MCNC: 0.6 MG/DL (ref 0.2–1.2)
BUN BLD-MCNC: 14 MG/DL (ref 6–20)
BUN/CREAT SERPL: 21.5 (ref 7–25)
CALCIUM SPEC-SCNC: 9.3 MG/DL (ref 8.6–10.5)
CHLORIDE SERPL-SCNC: 102 MMOL/L (ref 98–107)
CO2 SERPL-SCNC: 26 MMOL/L (ref 22–29)
CREAT BLD-MCNC: 0.65 MG/DL (ref 0.57–1)
GFR SERPL CREATININE-BSD FRML MDRD: 102 ML/MIN/1.73
GLOBULIN UR ELPH-MCNC: 3.2 GM/DL
GLUCOSE BLD-MCNC: 82 MG/DL (ref 65–99)
POTASSIUM BLD-SCNC: 3.6 MMOL/L (ref 3.5–5.2)
PROT SERPL-MCNC: 7.5 G/DL (ref 6–8.5)
SODIUM BLD-SCNC: 139 MMOL/L (ref 136–145)
T4 FREE SERPL-MCNC: 0.97 NG/DL (ref 0.93–1.7)
TSH SERPL DL<=0.05 MIU/L-ACNC: 2.39 UIU/ML (ref 0.27–4.2)

## 2020-04-24 PROCEDURE — 36415 COLL VENOUS BLD VENIPUNCTURE: CPT

## 2020-04-24 PROCEDURE — 84439 ASSAY OF FREE THYROXINE: CPT

## 2020-04-24 PROCEDURE — 84443 ASSAY THYROID STIM HORMONE: CPT

## 2020-04-24 PROCEDURE — 80053 COMPREHEN METABOLIC PANEL: CPT

## 2020-04-24 PROCEDURE — 86376 MICROSOMAL ANTIBODY EACH: CPT

## 2020-04-25 LAB — THYROPEROXIDASE AB SERPL-ACNC: 9 IU/ML (ref 0–34)

## 2020-04-30 ENCOUNTER — TELEMEDICINE (OUTPATIENT)
Dept: ENDOCRINOLOGY | Facility: CLINIC | Age: 40
End: 2020-04-30

## 2020-04-30 VITALS
WEIGHT: 140 LBS | SYSTOLIC BLOOD PRESSURE: 96 MMHG | DIASTOLIC BLOOD PRESSURE: 63 MMHG | HEIGHT: 69 IN | HEART RATE: 57 BPM | BODY MASS INDEX: 20.73 KG/M2

## 2020-04-30 DIAGNOSIS — E55.9 VITAMIN D DEFICIENCY: ICD-10-CM

## 2020-04-30 DIAGNOSIS — E06.3 HASHIMOTO'S THYROIDITIS: Primary | ICD-10-CM

## 2020-04-30 PROCEDURE — 99213 OFFICE O/P EST LOW 20 MIN: CPT | Performed by: INTERNAL MEDICINE

## 2020-04-30 NOTE — PROGRESS NOTES
Endocrine Progress Note Outpatient     Patient Care Team:  Lilo Nettles MD as PCP - General  Lilo Nettles MD as PCP - Family Medicine  You have chosen to receive care through a telehealth visit.  Do you consent to use a video/audio connection for your medical care today? Yes.   Chief Complaint: Follow-up Hashimoto's thyroiditis: Patient was seen through audio and video conference utilizing the [employer] platform    HPI: 39-year-old female with history of Hashimoto's thyroiditis and vitamin D deficiency is followed through telehealth.  For Hashimoto's thyroiditis she was on [place] Thyroid but about 6 months ago she tells me that she stopped [place] Thyroid because it was making her feeling like antsy.  She has been doing very well without [place] Thyroid.     Vitamin D deficiency: She is on vitamin D supplementation.    Past Medical History:   Diagnosis Date   • Hypothyroidism    • Vitamin D deficiency        Social History     Socioeconomic History   • Marital status:      Spouse name: Not on file   • Number of children: Not on file   • Years of education: Not on file   • Highest education level: Not on file   Tobacco Use   • Smoking status: Never Smoker   • Smokeless tobacco: Never Used   Substance and Sexual Activity   • Alcohol use: Yes     Comment: social       Family History   Problem Relation Age of Onset   • Depression Mother    • Thyroid disease Mother    • Depression Father    • Irritable bowel syndrome Father    • Seizures Sister    • Anxiety disorder Sister    • Seizures Brother        Allergies   Allergen Reactions   • Amoxicillin Unknown (See Comments)   • Levofloxacin Rash   • Shellfish-Derived Products Nausea And Vomiting       ROS:   Constitutional:  Denies fatigue, tiredness.    Eyes:  Denies change in visual acuity   HENT:  Denies nasal congestion or sore throat   Respiratory: denies cough, shortness of breath.   Cardiovascular:  denies chest pain, edema    GI:  Denies abdominal pain, nausea, vomiting.   Musculoskeletal:  Denies back pain or joint pain   Integument:  Denies dry skin and rash   Neurologic:  Denies headache, focal weakness or sensory changes   Endocrine:  Denies polyuria or polydipsia   Psychiatric:  Denies depression or anxiety      Vitals:    04/30/20 0846   BP: 96/63   Pulse: 57       Physical Exam:  GEN: NAD, patient looked healthy on video, alert and oriented and able to carry on conversation.  Mood and affect were normal  Respiratory effort was normal.      Results Review:     I reviewed the patient's new clinical results.      Lab Results   Component Value Date    GLUCOSE 82 04/24/2020    BUN 14 04/24/2020    CREATININE 0.65 04/24/2020    EGFRIFNONA 102 04/24/2020    EGFRIFAFRI 115 12/04/2018    BCR 21.5 04/24/2020    K 3.6 04/24/2020    CO2 26.0 04/24/2020    CALCIUM 9.3 04/24/2020    ALBUMIN 4.30 04/24/2020    LABIL2 1.6 04/11/2019    AST 19 04/24/2020    ALT 16 04/24/2020     Lab Results   Component Value Date    TSH 2.390 04/24/2020    FREET4 0.97 04/24/2020    THYROIDAB 9 04/24/2020     Labs from October 15, 2019 showed a TSH of 2.5 with free T3 2.8.    Medication Review: Reviewed.       Current Outpatient Medications:   •  amphetamine-dextroamphetamine (ADDERALL) 10 MG tablet, Every 12 (Twelve) Hours., Disp: , Rfl:   •  budesonide (RHINOCORT ALLERGY) 32 MCG/ACT nasal spray, RHINOCORT ALLERGY 32 MCG/ACT SUSP, Disp: , Rfl:   •  escitalopram (LEXAPRO) 20 MG tablet, Daily., Disp: , Rfl:   •  hydrOXYzine (ATARAX) 10 MG tablet, Take 10 mg by mouth 2 (Two) Times a Day., Disp: , Rfl:   •  MULTIPLE VITAMIN PO, MULTIVITAMINS TABS, Disp: , Rfl:   •  thyroid (ARMOUR THYROID) 15 MG tablet, Daily., Disp: , Rfl:   •  vitamin D (ERGOCALCIFEROL) 1.25 MG (58329 UT) capsule capsule, , Disp: , Rfl:       Assessment/Plan   1.  Hashimoto's thyroiditis: Clinically doing very well.  Has been off [place] Thyroid for last 6 months and her TSH, free T4 and TPO  antibodies are normal.  Clinically doing well.  Advised to stay off [place] Thyroid and will follow her in 1 year with labs.  Advised to call if she develops any excessive symptoms of fatigue, tiredness, dry skin, hair loss or constipation or unexplained weight gain.  She verbalized understanding.      2.  Vitamin D deficiency: She is currently on vitamin D supplementation.  Continue that.            Jens Peacock MD FACE.    Much of the above report is an electronic transcription/translation of the spoken language to printed text using Dragon Software. As such, the subtleties and finesse of the spoken language may permit erroneous, or at times, nonsensical words or phrases to be inadvertently transcribed; thus changes may be made at a later date to rectify these errors.

## 2020-05-05 ENCOUNTER — TRANSCRIBE ORDERS (OUTPATIENT)
Dept: ADMINISTRATIVE | Facility: HOSPITAL | Age: 40
End: 2020-05-05

## 2020-05-05 DIAGNOSIS — N60.02 CYST OF BREAST, LEFT, SOLITARY: Primary | ICD-10-CM

## 2020-05-06 ENCOUNTER — HOSPITAL ENCOUNTER (OUTPATIENT)
Dept: MAMMOGRAPHY | Facility: HOSPITAL | Age: 40
Discharge: HOME | End: 2020-05-06
Admitting: INTERNAL MEDICINE

## 2020-05-06 ENCOUNTER — APPOINTMENT (OUTPATIENT)
Dept: OTHER | Facility: HOSPITAL | Age: 40
End: 2020-05-06

## 2020-05-06 ENCOUNTER — HOSPITAL ENCOUNTER (OUTPATIENT)
Dept: ULTRASOUND IMAGING | Facility: HOSPITAL | Age: 40
Discharge: HOME | End: 2020-05-06

## 2020-05-06 DIAGNOSIS — N60.02 CYST OF BREAST, LEFT, SOLITARY: ICD-10-CM

## 2020-05-06 DIAGNOSIS — Z00.6 EXAMINATION FOR NORMAL COMPARISON OR CONTROL IN CLINICAL RESEARCH: ICD-10-CM

## 2020-05-06 PROCEDURE — 76642 ULTRASOUND BREAST LIMITED: CPT

## 2020-06-09 ENCOUNTER — APPOINTMENT (OUTPATIENT)
Dept: ULTRASOUND IMAGING | Facility: HOSPITAL | Age: 40
End: 2020-06-09

## 2020-06-09 ENCOUNTER — APPOINTMENT (OUTPATIENT)
Dept: MAMMOGRAPHY | Facility: HOSPITAL | Age: 40
End: 2020-06-09

## 2020-08-12 ENCOUNTER — TELEPHONE (OUTPATIENT)
Dept: SURGERY | Facility: CLINIC | Age: 40
End: 2020-08-12

## 2021-04-30 ENCOUNTER — OFFICE VISIT (OUTPATIENT)
Dept: ENDOCRINOLOGY | Facility: CLINIC | Age: 41
End: 2021-04-30

## 2021-04-30 VITALS
HEART RATE: 82 BPM | SYSTOLIC BLOOD PRESSURE: 125 MMHG | WEIGHT: 143 LBS | HEIGHT: 69 IN | DIASTOLIC BLOOD PRESSURE: 70 MMHG | OXYGEN SATURATION: 97 % | TEMPERATURE: 97.3 F | BODY MASS INDEX: 21.18 KG/M2

## 2021-04-30 DIAGNOSIS — E06.3 HASHIMOTO'S THYROIDITIS: Primary | ICD-10-CM

## 2021-04-30 DIAGNOSIS — E55.9 VITAMIN D DEFICIENCY: ICD-10-CM

## 2021-04-30 PROCEDURE — 99213 OFFICE O/P EST LOW 20 MIN: CPT | Performed by: INTERNAL MEDICINE

## 2021-04-30 NOTE — PROGRESS NOTES
Endocrine Progress Note Outpatient     Patient Care Team:  Sobia Buckley MD as PCP - General (Internal Medicine)  Lilo Nettles MD as PCP - Family Medicine    Chief Complaint: Follow-up Hashimoto's thyroiditis          HPI: 40-year-old female with history of Hashimoto thyroiditis and vitamin D deficiency is here for follow-up.  For Hashimoto thyroiditis: She did take [place] Thyroid for about 6 months has but has been off for at least more than a year now and feeling fairly well.  She was in a stressful situation but that has been resolved and she is doing fairly well at this time.  Vitamin D deficiency: She is taking over-the-counter vitamin D supplementation.    Past Medical History:   Diagnosis Date   • Hypothyroidism    • Vitamin D deficiency        Social History     Socioeconomic History   • Marital status:      Spouse name: Not on file   • Number of children: Not on file   • Years of education: Not on file   • Highest education level: Not on file   Tobacco Use   • Smoking status: Never Smoker   • Smokeless tobacco: Never Used   Vaping Use   • Vaping Use: Never used   Substance and Sexual Activity   • Alcohol use: Yes     Comment: social   • Drug use: Defer   • Sexual activity: Defer       Family History   Problem Relation Age of Onset   • Depression Mother    • Thyroid disease Mother    • Depression Father    • Irritable bowel syndrome Father    • Seizures Sister    • Anxiety disorder Sister    • Seizures Brother        Allergies   Allergen Reactions   • Amoxicillin Unknown (See Comments)   • Levofloxacin Rash   • Shellfish-Derived Products Nausea And Vomiting       ROS:   Constitutional:  Denies fatigue, tiredness.    Eyes:  Denies change in visual acuity   HENT:  Denies nasal congestion or sore throat   Respiratory: denies cough, shortness of breath.   Cardiovascular:  denies chest pain, edema   GI:  Denies abdominal pain, nausea, vomiting.   Musculoskeletal:  Denies back pain or  joint pain   Integument:  Denies dry skin and rash   Neurologic:  Denies headache, focal weakness or sensory changes   Endocrine:  Denies polyuria or polydipsia   Psychiatric:  Denies depression or anxiety      Vitals:    04/30/21 1321   BP: 125/70   Pulse: 82   Temp: 97.3 °F (36.3 °C)   SpO2: 97%     Body mass index is 21.12 kg/m².     Physical Exam:  GEN: NAD, conversant  EYES: EOMI, PERRL, no conjunctival erythema  NECK: no thyromegaly, full ROM   CV: RRR, no murmurs/rubs/gallops, no peripheral edema  LUNG: CTAB, no wheezes/rales/ronchi  SKIN: no rashes, no acanthosis  MSK: no deformities, full ROM of all extremities  NEURO: no tremors, DTR normal  PSYCH: AOX3, appropriate mood, affect normal      Results Review:     I reviewed the patient's new clinical results.      Labs from April 21, 2021 showed a sodium 139, potassium 4.3, chloride 104, CO2 30, glucose 87, BUN 14, creatinine 1.65, TSH 1.42 and free T4 0.9.      Medication Review: Reviewed.       Current Outpatient Medications:   •  amphetamine-dextroamphetamine (ADDERALL) 10 MG tablet, Every 12 (Twelve) Hours., Disp: , Rfl:   •  budesonide (RHINOCORT ALLERGY) 32 MCG/ACT nasal spray, RHINOCORT ALLERGY 32 MCG/ACT SUSP, Disp: , Rfl:   •  escitalopram (LEXAPRO) 20 MG tablet, Daily., Disp: , Rfl:   •  hydrOXYzine (ATARAX) 10 MG tablet, Take 10 mg by mouth 2 (Two) Times a Day., Disp: , Rfl:   •  MULTIPLE VITAMIN PO, MULTIVITAMINS TABS, Disp: , Rfl:   •  vitamin D (ERGOCALCIFEROL) 1.25 MG (33258 UT) capsule capsule, Take 1,000 Units by mouth Daily., Disp: , Rfl:       Assessment/Plan   1.  Hashimoto's thyroiditis: Remains euthyroid without thyroid supplementation.  Will check TG antibodies as they were high in the past.  2.  Vitamin D deficiency: She is taking vitamin D over-the-counter.            Jens Peacock MD FACE.

## 2021-05-28 PROCEDURE — 87086 URINE CULTURE/COLONY COUNT: CPT | Performed by: PHYSICIAN ASSISTANT

## 2021-05-28 PROCEDURE — 87186 SC STD MICRODIL/AGAR DIL: CPT | Performed by: PHYSICIAN ASSISTANT

## 2021-05-28 PROCEDURE — 87077 CULTURE AEROBIC IDENTIFY: CPT | Performed by: PHYSICIAN ASSISTANT

## 2021-09-19 PROCEDURE — U0004 COV-19 TEST NON-CDC HGH THRU: HCPCS | Performed by: PHYSICIAN ASSISTANT

## 2021-10-26 PROCEDURE — 87086 URINE CULTURE/COLONY COUNT: CPT | Performed by: FAMILY MEDICINE

## 2022-01-06 PROBLEM — Z20.822 SUSPECTED COVID-19 VIRUS INFECTION: Status: ACTIVE | Noted: 2022-01-06

## 2022-01-06 PROCEDURE — U0004 COV-19 TEST NON-CDC HGH THRU: HCPCS | Performed by: NURSE PRACTITIONER

## 2022-01-21 ENCOUNTER — APPOINTMENT (OUTPATIENT)
Dept: GENERAL RADIOLOGY | Facility: HOSPITAL | Age: 42
End: 2022-01-21

## 2022-01-21 ENCOUNTER — APPOINTMENT (OUTPATIENT)
Dept: CARDIOLOGY | Facility: HOSPITAL | Age: 42
End: 2022-01-21

## 2022-01-21 ENCOUNTER — HOSPITAL ENCOUNTER (EMERGENCY)
Facility: HOSPITAL | Age: 42
Discharge: HOME | End: 2022-01-21
Attending: EMERGENCY MEDICINE | Admitting: EMERGENCY MEDICINE

## 2022-01-21 ENCOUNTER — APPOINTMENT (OUTPATIENT)
Dept: CT IMAGING | Facility: HOSPITAL | Age: 42
End: 2022-01-21

## 2022-01-21 VITALS
WEIGHT: 148.81 LBS | DIASTOLIC BLOOD PRESSURE: 70 MMHG | RESPIRATION RATE: 16 BRPM | SYSTOLIC BLOOD PRESSURE: 110 MMHG | BODY MASS INDEX: 22.04 KG/M2 | HEART RATE: 68 BPM | OXYGEN SATURATION: 100 % | TEMPERATURE: 97.4 F | HEIGHT: 69 IN

## 2022-01-21 DIAGNOSIS — M79.604 PAIN OF RIGHT LOWER EXTREMITY: Primary | ICD-10-CM

## 2022-01-21 DIAGNOSIS — R91.1 PULMONARY NODULE: ICD-10-CM

## 2022-01-21 DIAGNOSIS — R06.00 DYSPNEA, UNSPECIFIED TYPE: ICD-10-CM

## 2022-01-21 DIAGNOSIS — M54.50 LOW BACK PAIN, UNSPECIFIED BACK PAIN LATERALITY, UNSPECIFIED CHRONICITY, UNSPECIFIED WHETHER SCIATICA PRESENT: ICD-10-CM

## 2022-01-21 LAB
ALBUMIN SERPL-MCNC: 4.5 G/DL (ref 3.5–5.2)
ALBUMIN/GLOB SERPL: 1.6 G/DL
ALP SERPL-CCNC: 68 U/L (ref 39–117)
ALT SERPL W P-5'-P-CCNC: 15 U/L (ref 1–33)
ANION GAP SERPL CALCULATED.3IONS-SCNC: 8 MMOL/L (ref 5–15)
APTT PPP: 27.1 SECONDS (ref 24–31)
AST SERPL-CCNC: 21 U/L (ref 1–32)
B-HCG UR QL: NEGATIVE
BACTERIA UR QL AUTO: ABNORMAL /HPF
BASOPHILS # BLD AUTO: 0 10*3/MM3 (ref 0–0.2)
BASOPHILS NFR BLD AUTO: 0.5 % (ref 0–1.5)
BH CV LOWER VASCULAR LEFT COMMON FEMORAL AUGMENT: NORMAL
BH CV LOWER VASCULAR LEFT COMMON FEMORAL COMPETENT: NORMAL
BH CV LOWER VASCULAR LEFT COMMON FEMORAL COMPRESS: NORMAL
BH CV LOWER VASCULAR LEFT COMMON FEMORAL PHASIC: NORMAL
BH CV LOWER VASCULAR LEFT COMMON FEMORAL SPONT: NORMAL
BH CV LOWER VASCULAR RIGHT COMMON FEMORAL AUGMENT: NORMAL
BH CV LOWER VASCULAR RIGHT COMMON FEMORAL COMPETENT: NORMAL
BH CV LOWER VASCULAR RIGHT COMMON FEMORAL COMPRESS: NORMAL
BH CV LOWER VASCULAR RIGHT COMMON FEMORAL PHASIC: NORMAL
BH CV LOWER VASCULAR RIGHT COMMON FEMORAL SPONT: NORMAL
BH CV LOWER VASCULAR RIGHT DISTAL FEMORAL COMPRESS: NORMAL
BH CV LOWER VASCULAR RIGHT GASTRONEMIUS COMPRESS: NORMAL
BH CV LOWER VASCULAR RIGHT GREATER SAPH AK COMPRESS: NORMAL
BH CV LOWER VASCULAR RIGHT GREATER SAPH BK COMPRESS: NORMAL
BH CV LOWER VASCULAR RIGHT LESSER SAPH COMPRESS: NORMAL
BH CV LOWER VASCULAR RIGHT MID FEMORAL AUGMENT: NORMAL
BH CV LOWER VASCULAR RIGHT MID FEMORAL COMPETENT: NORMAL
BH CV LOWER VASCULAR RIGHT MID FEMORAL COMPRESS: NORMAL
BH CV LOWER VASCULAR RIGHT MID FEMORAL PHASIC: NORMAL
BH CV LOWER VASCULAR RIGHT MID FEMORAL SPONT: NORMAL
BH CV LOWER VASCULAR RIGHT PERONEAL COMPRESS: NORMAL
BH CV LOWER VASCULAR RIGHT POPLITEAL AUGMENT: NORMAL
BH CV LOWER VASCULAR RIGHT POPLITEAL COMPETENT: NORMAL
BH CV LOWER VASCULAR RIGHT POPLITEAL COMPRESS: NORMAL
BH CV LOWER VASCULAR RIGHT POPLITEAL PHASIC: NORMAL
BH CV LOWER VASCULAR RIGHT POPLITEAL SPONT: NORMAL
BH CV LOWER VASCULAR RIGHT POSTERIOR TIBIAL COMPRESS: NORMAL
BH CV LOWER VASCULAR RIGHT PROXIMAL FEMORAL COMPRESS: NORMAL
BH CV LOWER VASCULAR RIGHT SAPHENOFEMORAL JUNCTION COMPRESS: NORMAL
BILIRUB SERPL-MCNC: 0.3 MG/DL (ref 0–1.2)
BILIRUB UR QL STRIP: NEGATIVE
BUN SERPL-MCNC: 16 MG/DL (ref 6–20)
BUN/CREAT SERPL: 29.6 (ref 7–25)
CALCIUM SPEC-SCNC: 9.4 MG/DL (ref 8.6–10.5)
CHLORIDE SERPL-SCNC: 103 MMOL/L (ref 98–107)
CK SERPL-CCNC: 68 U/L (ref 20–180)
CLARITY UR: CLEAR
CO2 SERPL-SCNC: 28 MMOL/L (ref 22–29)
COLOR UR: YELLOW
CREAT SERPL-MCNC: 0.54 MG/DL (ref 0.57–1)
DEPRECATED RDW RBC AUTO: 45.5 FL (ref 37–54)
EOSINOPHIL # BLD AUTO: 0.2 10*3/MM3 (ref 0–0.4)
EOSINOPHIL NFR BLD AUTO: 1.8 % (ref 0.3–6.2)
ERYTHROCYTE [DISTWIDTH] IN BLOOD BY AUTOMATED COUNT: 13.2 % (ref 12.3–15.4)
GFR SERPL CREATININE-BSD FRML MDRD: 125 ML/MIN/1.73
GLOBULIN UR ELPH-MCNC: 2.9 GM/DL
GLUCOSE SERPL-MCNC: 81 MG/DL (ref 65–99)
GLUCOSE UR STRIP-MCNC: NEGATIVE MG/DL
HCT VFR BLD AUTO: 37.1 % (ref 34–46.6)
HGB BLD-MCNC: 12.8 G/DL (ref 12–15.9)
HGB UR QL STRIP.AUTO: ABNORMAL
HOLD SPECIMEN: NORMAL
HOLD SPECIMEN: NORMAL
HYALINE CASTS UR QL AUTO: ABNORMAL /LPF
INR PPP: 0.97 (ref 0.93–1.1)
KETONES UR QL STRIP: NEGATIVE
LEUKOCYTE ESTERASE UR QL STRIP.AUTO: NEGATIVE
LYMPHOCYTES # BLD AUTO: 1.8 10*3/MM3 (ref 0.7–3.1)
LYMPHOCYTES NFR BLD AUTO: 19.4 % (ref 19.6–45.3)
MAXIMAL PREDICTED HEART RATE: 180 BPM
MCH RBC QN AUTO: 34 PG (ref 26.6–33)
MCHC RBC AUTO-ENTMCNC: 34.6 G/DL (ref 31.5–35.7)
MCV RBC AUTO: 98.4 FL (ref 79–97)
MONOCYTES # BLD AUTO: 0.6 10*3/MM3 (ref 0.1–0.9)
MONOCYTES NFR BLD AUTO: 6.5 % (ref 5–12)
NEUTROPHILS NFR BLD AUTO: 6.5 10*3/MM3 (ref 1.7–7)
NEUTROPHILS NFR BLD AUTO: 71.8 % (ref 42.7–76)
NITRITE UR QL STRIP: NEGATIVE
NRBC BLD AUTO-RTO: 0.1 /100 WBC (ref 0–0.2)
NT-PROBNP SERPL-MCNC: 134.5 PG/ML (ref 0–450)
PH UR STRIP.AUTO: 7.5 [PH] (ref 5–8)
PLATELET # BLD AUTO: 205 10*3/MM3 (ref 140–450)
PMV BLD AUTO: 9.3 FL (ref 6–12)
POTASSIUM SERPL-SCNC: 3.7 MMOL/L (ref 3.5–5.2)
PROT SERPL-MCNC: 7.4 G/DL (ref 6–8.5)
PROT UR QL STRIP: NEGATIVE
PROTHROMBIN TIME: 10.8 SECONDS (ref 9.6–11.7)
RBC # BLD AUTO: 3.77 10*6/MM3 (ref 3.77–5.28)
RBC # UR STRIP: ABNORMAL /HPF
REF LAB TEST METHOD: ABNORMAL
SODIUM SERPL-SCNC: 139 MMOL/L (ref 136–145)
SP GR UR STRIP: 1.04 (ref 1–1.03)
SQUAMOUS #/AREA URNS HPF: ABNORMAL /HPF
STRESS TARGET HR: 153 BPM
TROPONIN T SERPL-MCNC: <0.01 NG/ML (ref 0–0.03)
UROBILINOGEN UR QL STRIP: ABNORMAL
WBC # UR STRIP: ABNORMAL /HPF
WBC NRBC COR # BLD: 9 10*3/MM3 (ref 3.4–10.8)
WHOLE BLOOD HOLD SPECIMEN: NORMAL
WHOLE BLOOD HOLD SPECIMEN: NORMAL

## 2022-01-21 PROCEDURE — 93971 EXTREMITY STUDY: CPT

## 2022-01-21 PROCEDURE — 81025 URINE PREGNANCY TEST: CPT | Performed by: EMERGENCY MEDICINE

## 2022-01-21 PROCEDURE — 80053 COMPREHEN METABOLIC PANEL: CPT | Performed by: EMERGENCY MEDICINE

## 2022-01-21 PROCEDURE — 81001 URINALYSIS AUTO W/SCOPE: CPT | Performed by: EMERGENCY MEDICINE

## 2022-01-21 PROCEDURE — 71275 CT ANGIOGRAPHY CHEST: CPT

## 2022-01-21 PROCEDURE — 73552 X-RAY EXAM OF FEMUR 2/>: CPT

## 2022-01-21 PROCEDURE — 93005 ELECTROCARDIOGRAM TRACING: CPT | Performed by: EMERGENCY MEDICINE

## 2022-01-21 PROCEDURE — 85610 PROTHROMBIN TIME: CPT | Performed by: EMERGENCY MEDICINE

## 2022-01-21 PROCEDURE — 82550 ASSAY OF CK (CPK): CPT | Performed by: EMERGENCY MEDICINE

## 2022-01-21 PROCEDURE — 0 IOPAMIDOL PER 1 ML: Performed by: EMERGENCY MEDICINE

## 2022-01-21 PROCEDURE — 99283 EMERGENCY DEPT VISIT LOW MDM: CPT

## 2022-01-21 PROCEDURE — 83880 ASSAY OF NATRIURETIC PEPTIDE: CPT | Performed by: EMERGENCY MEDICINE

## 2022-01-21 PROCEDURE — 85025 COMPLETE CBC W/AUTO DIFF WBC: CPT | Performed by: EMERGENCY MEDICINE

## 2022-01-21 PROCEDURE — 84484 ASSAY OF TROPONIN QUANT: CPT | Performed by: EMERGENCY MEDICINE

## 2022-01-21 PROCEDURE — 85730 THROMBOPLASTIN TIME PARTIAL: CPT | Performed by: EMERGENCY MEDICINE

## 2022-01-21 PROCEDURE — 72110 X-RAY EXAM L-2 SPINE 4/>VWS: CPT

## 2022-01-21 RX ORDER — SODIUM CHLORIDE 0.9 % (FLUSH) 0.9 %
10 SYRINGE (ML) INJECTION AS NEEDED
Status: DISCONTINUED | OUTPATIENT
Start: 2022-01-21 | End: 2022-01-21 | Stop reason: HOSPADM

## 2022-01-21 RX ADMIN — IOPAMIDOL 83 ML: 755 INJECTION, SOLUTION INTRAVENOUS at 18:28

## 2022-01-21 NOTE — ED PROVIDER NOTES
"Subjective   Chief complaint: Patient is a pleasant 41-year-old.  She states that yesterday she stood up and got a significant pain in her thigh.  The pain actually shoots up to the back of her pelvis.  When she stands.  It brought her in buckled her legs it was so severe.  She has chronic knee pain following a skateboarding incident many many years ago.  This is different than that.  She had COVID 2 weeks ago and she has an underlying blood clotting disorder.  She is concerned about a blood clot to her legs.  She had a superficial venous thrombosis in the past.  She was actually placed on blood thinner postoperatively to prevent secondary to this.  But with this pain and her not noting a significant injury she did present for evaluation as there was a concern for blood clot.  She has no numbness or weakness.  She has noted some diarrhea and she has noted some intermittent dyspnea.  It is nondescript.  She states occasionally she just feels \"short of breath\".  She does not have any chest pain.  Her COVID symptoms are improving otherwise.    Context: As noted above.    Duration: Leg pain since yesterday.  Other symptoms over the last few days    Timing: Sudden onset    Severity: Her pain can be severe.  She does however declined pain medicine this point time.    Associated Symptoms: Negative except as noted above.  Appropriate PPE was used.        PCP:  LMP: Hysterectomy          Review of Systems   Constitutional: Negative for fatigue.   HENT: Negative for congestion.    Eyes: Negative.    Respiratory: Positive for shortness of breath.    Gastrointestinal: Positive for diarrhea.   Genitourinary: Negative.    Musculoskeletal: Positive for arthralgias.   Skin: Negative.    Neurological: Negative for headaches.   Psychiatric/Behavioral: Negative.        Past Medical History:   Diagnosis Date   • DVT (deep venous thrombosis) ([facility]) 2017    pt states caused by IV; superficial   • Hypothyroidism    • Vitamin D deficiency  "       Allergies   Allergen Reactions   • Amoxicillin Unknown (See Comments)   • Levofloxacin Rash   • Shellfish-Derived Products Nausea And Vomiting       Past Surgical History:   Procedure Laterality Date   • BREAST AUGMENTATION     • BREAST BIOPSY     •  SECTION     • FOOT SURGERY     • HEMORRHOIDECTOMY     • HERNIA REPAIR         Family History   Problem Relation Age of Onset   • Depression Mother    • Thyroid disease Mother    • Depression Father    • Irritable bowel syndrome Father    • Seizures Sister    • Anxiety disorder Sister    • Seizures Brother        Social History     Socioeconomic History   • Marital status:    Tobacco Use   • Smoking status: Never Smoker   • Smokeless tobacco: Never Used   Vaping Use   • Vaping Use: Never used   Substance and Sexual Activity   • Alcohol use: Yes     Comment: social   • Drug use: Defer   • Sexual activity: Defer           Objective   Physical Exam  Vitals and nursing note reviewed. Exam conducted with a chaperone present.   Constitutional:       Appearance: Normal appearance.   HENT:      Head: Normocephalic and atraumatic.   Eyes:      Extraocular Movements: Extraocular movements intact.      Pupils: Pupils are equal, round, and reactive to light.   Cardiovascular:      Rate and Rhythm: Normal rate and regular rhythm.      Pulses: Normal pulses.      Heart sounds: Normal heart sounds.   Pulmonary:      Effort: Pulmonary effort is normal.      Breath sounds: Normal breath sounds.   Abdominal:      Tenderness: There is no abdominal tenderness.   Musculoskeletal:      Cervical back: Neck supple.      Right upper leg: Tenderness present. No swelling or bony tenderness.        Legs:    Skin:     General: Skin is warm and dry.      Capillary Refill: Capillary refill takes less than 2 seconds.   Neurological:      General: No focal deficit present.      Mental Status: She is alert and oriented to person, place, and time.   Psychiatric:         Mood and  Affect: Mood normal.         Procedures           ED Course      Per vascular tech [place] patient is negative DVT     EKG sinus rhythm rate of 65    Results for orders placed or performed during the hospital encounter of 01/21/22   Comprehensive Metabolic Panel    Specimen: Blood   Result Value Ref Range    Glucose 81 65 - 99 mg/dL    BUN 16 6 - 20 mg/dL    Creatinine 0.54 (L) 0.57 - 1.00 mg/dL    Sodium 139 136 - 145 mmol/L    Potassium 3.7 3.5 - 5.2 mmol/L    Chloride 103 98 - 107 mmol/L    CO2 28.0 22.0 - 29.0 mmol/L    Calcium 9.4 8.6 - 10.5 mg/dL    Total Protein 7.4 6.0 - 8.5 g/dL    Albumin 4.50 3.50 - 5.20 g/dL    ALT (SGPT) 15 1 - 33 U/L    AST (SGOT) 21 1 - 32 U/L    Alkaline Phosphatase 68 39 - 117 U/L    Total Bilirubin 0.3 0.0 - 1.2 mg/dL    eGFR Non African Amer 125 >60 mL/min/1.73    Globulin 2.9 gm/dL    A/G Ratio 1.6 g/dL    BUN/Creatinine Ratio 29.6 (H) 7.0 - 25.0    Anion Gap 8.0 5.0 - 15.0 mmol/L   Protime-INR    Specimen: Blood   Result Value Ref Range    Protime 10.8 9.6 - 11.7 Seconds    INR 0.97 0.93 - 1.10   aPTT    Specimen: Blood   Result Value Ref Range    PTT 27.1 24.0 - 31.0 seconds   Troponin    Specimen: Blood   Result Value Ref Range    Troponin T <0.010 0.000 - 0.030 ng/mL   CBC Auto Differential    Specimen: Blood   Result Value Ref Range    WBC 9.00 3.40 - 10.80 10*3/mm3    RBC 3.77 3.77 - 5.28 10*6/mm3    Hemoglobin 12.8 12.0 - 15.9 g/dL    Hematocrit 37.1 34.0 - 46.6 %    MCV 98.4 (H) 79.0 - 97.0 fL    MCH 34.0 (H) 26.6 - 33.0 pg    MCHC 34.6 31.5 - 35.7 g/dL    RDW 13.2 12.3 - 15.4 %    RDW-SD 45.5 37.0 - 54.0 fl    MPV 9.3 6.0 - 12.0 fL    Platelets 205 140 - 450 10*3/mm3    Neutrophil % 71.8 42.7 - 76.0 %    Lymphocyte % 19.4 (L) 19.6 - 45.3 %    Monocyte % 6.5 5.0 - 12.0 %    Eosinophil % 1.8 0.3 - 6.2 %    Basophil % 0.5 0.0 - 1.5 %    Neutrophils, Absolute 6.50 1.70 - 7.00 10*3/mm3    Lymphocytes, Absolute 1.80 0.70 - 3.10 10*3/mm3    Monocytes, Absolute 0.60 0.10 - 0.90  10*3/mm3    Eosinophils, Absolute 0.20 0.00 - 0.40 10*3/mm3    Basophils, Absolute 0.00 0.00 - 0.20 10*3/mm3    nRBC 0.1 0.0 - 0.2 /100 WBC   BNP    Specimen: Blood   Result Value Ref Range    proBNP 134.5 0.0 - 450.0 pg/mL   CK    Specimen: Blood   Result Value Ref Range    Creatine Kinase 68 20 - 180 U/L   ECG 12 Lead   Result Value Ref Range    QT Interval 427 ms   Green Top (Gel)   Result Value Ref Range    Extra Tube Hold for add-ons.    Lavender Top   Result Value Ref Range    Extra Tube hold for add-on    Gold Top - SST   Result Value Ref Range    Extra Tube Hold for add-ons.    Light Blue Top   Result Value Ref Range    Extra Tube hold for add-on    Duplex Venous Lower Extremity - Right   Result Value Ref Range    Target HR (85%) 153 bpm    Max. Pred. HR (100%) 180 bpm    Right Common Femoral Spont Y     Right Common Femoral Phasic Y     Right Common Femoral Augment Y     Right Common Femoral Competent Y     Right Common Femoral Compress C     Right Saphenofemoral Junction Compress C     Right Proximal Femoral Compress C     Right Mid Femoral Spont Y     Right Mid Femoral Phasic Y     Right Mid Femoral Augment Y     Right Mid Femoral Competent Y     Right Mid Femoral Compress C     Right Distal Femoral Compress C     Right Popliteal Spont Y     Right Popliteal Phasic Y     Right Popliteal Augment Y     Right Popliteal Competent Y     Right Popliteal Compress C     Right Posterior Tibial Compress C     Right Peroneal Compress C     Right Gastronemius Compress C     Right Greater Saph AK Compress C     Right Greater Saph BK Compress C     Right Lesser Saph Compress C     Left Common Femoral Spont Y     Left Common Femoral Phasic Y     Left Common Femoral Augment Y     Left Common Femoral Competent Y     Left Common Femoral Compress C             XR Femur 2 View Right    Result Date: 1/21/2022  1. Negative for evidence of injury.  Electronically Signed By-Umu Gallardo MD On:1/21/2022 7:24 PM This report was  finalized on [id number] by  Umu Gallardo MD.    CT Chest Pulmonary Embolism    Result Date: 1/21/2022  1. Negative for pulmonary embolus. 2. There is a 6 mm left lower lobe pulmonary nodule. By Fleischner criteria,, recommend consideration of CT follow-up in 6-12 months.  Electronically Signed By-Umu Gallardo MD On:1/21/2022 7:06 PM This report was finalized on [id number] by  Umu Gallardo MD.    XR Spine Lumbar Complete 4+VW    Result Date: 1/21/2022  1. Normal appearance of the lumbar spine.  Electronically Signed By-Umu Gallardo MD On:1/21/2022 7:23 PM This report was finalized on [id number] by  Umu Gallardo MD.                                     MDM  Number of Diagnoses or Management Options  Dyspnea, unspecified type  Low back pain, unspecified back pain laterality, unspecified chronicity, unspecified whether sciatica present  Pain of right lower extremity  Pulmonary nodule  Diagnosis management comments: Patient has a clotting disorder recent history of COVID as well.  As she has remaining some dyspnea and diarrhea.  I did CT chest to rule out PE as she is high risk.  This is negative but she does have pulmonary nodule.  I discussed these findings with her and she will need repeat imaging to rule out cancer.  She verbalizes understanding is okay with this.  No signs of DVT on her ultrasound.  Her x-ray of her leg looks okay.  She has tenderness felt in the medial thigh.  Potentially when she stood up she pulled or strained the muscle system there.  I discussed this with her and she will follow-up outpatient.  At this point time I do not see any emergent findings.  I discussed with her if she has any worsening signs or symptoms she needs to return for reevaluation.  She verbalizes understanding is okay with this.       Amount and/or Complexity of Data Reviewed  Clinical lab tests: reviewed  Tests in the radiology section of CPT®: reviewed  Tests in the medicine section of CPT®:  reviewed  Independent visualization of images, tracings, or specimens: yes    Patient Progress  Patient progress: stable      Final diagnoses:   None     Pulmonary nodule  Right leg pain  Back pain  Dyspnea  ED Disposition  ED Disposition     None          No follow-up provider specified.       Medication List      No changes were made to your prescriptions during this visit.          Phan Campos DO  01/21/22 8798

## 2022-01-24 LAB — QT INTERVAL: 427 MS

## 2022-08-28 PROCEDURE — 87086 URINE CULTURE/COLONY COUNT: CPT | Performed by: NURSE PRACTITIONER

## 2023-01-23 PROCEDURE — 87086 URINE CULTURE/COLONY COUNT: CPT | Performed by: NURSE PRACTITIONER

## 2023-05-31 PROBLEM — Z20.3 RABIES CONTACT: Status: ACTIVE | Noted: 2023-05-31

## 2023-11-28 ENCOUNTER — TELEPHONE (OUTPATIENT)
Dept: NEUROLOGY | Facility: CLINIC | Age: 43
End: 2023-11-28

## 2023-11-28 NOTE — TELEPHONE ENCOUNTER
PATIENT EMG NCV TEST HAS TO BE SCHEDULED A WEEK APART PER REFERRAL; ATTEMPTED TO CONTACT PATIENT; NO ANSWER. PLEASE INFORM PATIENT THAT LOWER EXTREMITY APPOINTMENT WAS CHANGED TO 1/24/24 2 PM.

## 2023-12-12 NOTE — PROGRESS NOTES
EMG and Nerve Conduction Studies    Patient Name: Krystyna Cosme    Date of Study:12/13/23    Referring Provider:ELIZABETH DYER    History:    BUE- N&T    Results:    The complete report includes the data sheets.     Prior to starting the procedure, the patient's identity was verified, pertinent available records were reviewed, the nature of the procedure was explained, the appropriate site of the exam were confirmed directly with the patient, and a pre-procedure pause was performed for final verification of all the above.    1.  The right median sensory, palmar, and motor distal latencies are normal.  The right median motor forearm conduction velocity was normal.    2.  The left median sensory and motor nerve conduction studies were normal.    3.  The ulnar sensory and motor nerve conduction studies were normal bilaterally.    4.  EMG of the muscles examined in the bilateral C5-T1 myotomes were normal.         Impression:    This is a normal study of the upper extremities.  There is no evidence of focal median neuropathy at the wrist, ulnar neuropathy at the elbow or neurogenic change in the muscles examined in the C5-T1 myotomes.      Electronically signed by :    Joseph Seipel, M.D.  December 13, 2023

## 2023-12-13 ENCOUNTER — PROCEDURE VISIT (OUTPATIENT)
Dept: NEUROLOGY | Facility: CLINIC | Age: 43
End: 2023-12-13
Payer: COMMERCIAL

## 2023-12-13 VITALS
BODY MASS INDEX: 22.96 KG/M2 | DIASTOLIC BLOOD PRESSURE: 65 MMHG | HEIGHT: 69 IN | HEART RATE: 65 BPM | SYSTOLIC BLOOD PRESSURE: 99 MMHG | WEIGHT: 155 LBS

## 2023-12-13 DIAGNOSIS — R20.2 PARESTHESIA OF BOTH HANDS: Primary | ICD-10-CM

## 2023-12-13 RX ORDER — BUPROPION HYDROCHLORIDE 200 MG/1
TABLET, EXTENDED RELEASE ORAL
COMMUNITY
Start: 2023-08-17

## 2024-01-23 NOTE — PROGRESS NOTES
EMG and Nerve Conduction Studies    Patient Name: Krystyan Cosme    Date of Study:1/24/24    Referring Provider:ELIZABETH DYER    History:    BLE-N&T    Results:    The complete report includes the data sheets.     Prior to starting the procedure, the patient's identity was verified, pertinent available records were reviewed, the nature of the procedure was explained, the appropriate site of the exam were confirmed directly with the patient, and a pre-procedure pause was performed for final verification of all the above.    1.  Right sural sensory nerve conduction study was normal.    2.  The right and left medial plantar latencies were normal the amplitude of the nerve action potential was mildly reduced.      3.  The right tibial motor nerve conduction study was normal.    4.  EMG of the muscles examined in the right  L4-S1 myotomes were normal.    Impression:    This is an essentially normal study except for mildly reduced amplitudes on the medial plantar studies.  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  If the patient's symptoms persist or worsen repeat study in 6 or more months may be indicated for further evaluation of these potential abnormalities      Electronically signed by :    Joseph Seipel, M.D.  January 28, 2024                   Protopic Pregnancy And Lactation Text: This medication is Pregnancy Category C. It is unknown if this medication is excreted in breast milk when applied topically.

## 2024-01-24 ENCOUNTER — PROCEDURE VISIT (OUTPATIENT)
Dept: NEUROLOGY | Facility: CLINIC | Age: 44
End: 2024-01-24
Payer: COMMERCIAL

## 2024-01-24 VITALS
SYSTOLIC BLOOD PRESSURE: 108 MMHG | BODY MASS INDEX: 22.96 KG/M2 | DIASTOLIC BLOOD PRESSURE: 72 MMHG | HEIGHT: 69 IN | HEART RATE: 71 BPM | WEIGHT: 155 LBS

## 2024-01-24 DIAGNOSIS — R20.2 PARESTHESIA OF BOTH FEET: Primary | ICD-10-CM

## 2024-01-31 ENCOUNTER — TELEPHONE (OUTPATIENT)
Dept: NEUROLOGY | Facility: CLINIC | Age: 44
End: 2024-01-31
Payer: COMMERCIAL

## 2024-01-31 NOTE — TELEPHONE ENCOUNTER
Provider: SEIPEL    Caller: TAN    Phone Number: [phone]     Reason for Call: PT CALLED AND IS WANTING TO KNOW IF EMG THAT WAS COMPLETED ON 12/13/23 AND 01/24/24 CAN BE FAXED TO HER PCP. PT IS ALSO WANTING TO KNOW WHEN SENDING TEST RESULTS CAN RECOMMENDATION BE SENT OVER AS WELL.  FAX# [phone]    PLEASE REVIEW AND ADVISE.  THANK YOU

## 2024-02-01 ENCOUNTER — TELEPHONE (OUTPATIENT)
Dept: NEUROLOGY | Facility: CLINIC | Age: 44
End: 2024-02-01
Payer: COMMERCIAL

## 2024-02-01 NOTE — TELEPHONE ENCOUNTER
Spoke to patient and she is wanting to know if there are any recommendations for her regarding her EMG results and what nexts steps would be. Thanks

## 2024-06-03 ENCOUNTER — TELEPHONE (OUTPATIENT)
Dept: NEUROLOGY | Facility: CLINIC | Age: 44
End: 2024-06-03
Payer: COMMERCIAL

## 2024-06-03 NOTE — TELEPHONE ENCOUNTER
Provider: DR SEIPEL    Caller: PATIENT    Relationship to Patient: SELF    Phone Number: [phone]    Reason for Call: PATIENT CALLED TO REQUEST THAT WE SEND EMG FROM 1/24/24 TO HER PCP DR NAREN ROMAN. PREVIOUSLY SENT TO WRONG PCP AS WE HAD NOT UPDATED IN THE SYSTEM. FAX# [phone]. PLEASE REVIEW, THANK YOU.

## 2024-08-13 PROBLEM — R52 BODY ACHES: Status: ACTIVE | Noted: 2024-08-13

## 2024-09-17 PROCEDURE — 87086 URINE CULTURE/COLONY COUNT: CPT | Performed by: NURSE PRACTITIONER

## 2024-09-17 PROCEDURE — 87591 N.GONORRHOEAE DNA AMP PROB: CPT | Performed by: NURSE PRACTITIONER

## 2024-09-17 PROCEDURE — 87491 CHLMYD TRACH DNA AMP PROBE: CPT | Performed by: NURSE PRACTITIONER

## 2024-09-17 PROCEDURE — 87661 TRICHOMONAS VAGINALIS AMPLIF: CPT | Performed by: NURSE PRACTITIONER

## 2024-09-19 ENCOUNTER — TELEPHONE (OUTPATIENT)
Dept: URGENT CARE | Facility: CLINIC | Age: 44
End: 2024-09-19
Payer: COMMERCIAL

## 2024-12-19 PROCEDURE — 87591 N.GONORRHOEAE DNA AMP PROB: CPT | Performed by: NURSE PRACTITIONER

## 2024-12-19 PROCEDURE — 87661 TRICHOMONAS VAGINALIS AMPLIF: CPT | Performed by: NURSE PRACTITIONER

## 2024-12-19 PROCEDURE — 87086 URINE CULTURE/COLONY COUNT: CPT | Performed by: NURSE PRACTITIONER

## 2024-12-19 PROCEDURE — 87491 CHLMYD TRACH DNA AMP PROBE: CPT | Performed by: NURSE PRACTITIONER

## 2025-03-12 NOTE — PROGRESS NOTES
EMG and Nerve Conduction Studies    Patient Name: Krystyna Cosme  Date of birth June 27, 1980  Date of Study:3/17/25    Referring Provider:ELIZABETH DYER    History:    This patient is a 44-year-old female.  She complains of numbness in the toes bilaterally.    Results:    The complete report includes the data sheets.     Prior to starting the procedure, the patient's identity was verified, pertinent available records were reviewed, the nature of the procedure was explained, the appropriate site of the exam were confirmed directly with the patient, and a pre-procedure pause was performed for final verification of all the above.    1.  The right sural sensory distal latency is normal the peak-to-peak amplitude is normal at 10.6 ms which is not significantly different than the finding of 10.8 ms on the previous study January 24, 2024.    2.  The right medial plantar distal latency is normal the peak-to-peak amplitude is 3.2 ms which is reduced below the lower limit of normal of 10 ms and reduced compared to the previous finding of 8.6 µV.  The left medial plantar distal latency is normal the peak-to-peak amplitude is 4.6 ms which is slightly decreased compared to the finding of January 2024 of 6.3 ms.    3.  The left tibial motor and right tibial motor distal latencies amplitudes and conduction velocities are normal.  The right tibial motor study was previously performed with no significant difference on the current study.    4.  Electromyography of the vastus lateralis tibialis anterior, peroneus longus, gastrocnemius, extensor digitorum brevis, and abductor hallucis muscles were normal bilaterally.    Impression:    This is a mildly abnormal study due to decreased amplitude of the sensory nerve action potential on the medial plantar studies.  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  Compared to the previous study of January 2024 the amplitude of the medial plantar responses are slightly reduced.   There is no evidence of motor neuropathy with normal motor conduction study and normal electromyography of the muscles examined in the bilateral L3-S1 myotomes.      Electronically signed by :    Joseph Seipel, M.D.  March 17, 2025

## 2025-03-17 ENCOUNTER — PROCEDURE VISIT (OUTPATIENT)
Dept: NEUROLOGY | Facility: CLINIC | Age: 45
End: 2025-03-17
Payer: OTHER GOVERNMENT

## 2025-03-17 VITALS
HEIGHT: 69 IN | HEART RATE: 63 BPM | BODY MASS INDEX: 22.96 KG/M2 | SYSTOLIC BLOOD PRESSURE: 108 MMHG | WEIGHT: 155 LBS | DIASTOLIC BLOOD PRESSURE: 71 MMHG

## 2025-03-17 DIAGNOSIS — R20.2 PARESTHESIA OF BOTH FEET: Primary | ICD-10-CM

## 2025-03-17 PROCEDURE — 95909 NRV CNDJ TST 5-6 STUDIES: CPT | Performed by: PSYCHIATRY & NEUROLOGY

## 2025-03-17 PROCEDURE — 95886 MUSC TEST DONE W/N TEST COMP: CPT | Performed by: PSYCHIATRY & NEUROLOGY

## 2025-06-05 ENCOUNTER — TELEPHONE (OUTPATIENT)
Dept: FAMILY MEDICINE CLINIC | Facility: CLINIC | Age: 45
End: 2025-06-05
Payer: OTHER GOVERNMENT

## 2025-06-05 NOTE — TELEPHONE ENCOUNTER
Caller: Krystyna Cosme    Relationship: Self    Best call back number: [phone]    What is the best time to reach you: ANYTIME    Who are you requesting to speak with (clinical staff, provider,  specific staff member): CLINICAL       What was the call regarding: PATIENT IS WANTING TO MAKE SURE SHE IS ALL GOOD TO GO FOR HER APPOINTMENT MONDAY 6/9 AND THAT THE OFFICE RECEIVED HER MEDICAL RECORDS. PLEASE CALL     Is it okay if the provider responds through MyChart: NO

## 2025-06-06 NOTE — TELEPHONE ENCOUNTER
I SPOKE WITH PATIENT, ADVISED HER THAT ANYTHING THAT IS MISSING, I WILL HAVE HER SIGN RECORDS RELEASE AND SEND OFF FOR THEM

## 2025-06-09 ENCOUNTER — LAB (OUTPATIENT)
Dept: FAMILY MEDICINE CLINIC | Facility: CLINIC | Age: 45
End: 2025-06-09
Payer: OTHER GOVERNMENT

## 2025-06-09 ENCOUNTER — OFFICE VISIT (OUTPATIENT)
Dept: FAMILY MEDICINE CLINIC | Facility: CLINIC | Age: 45
End: 2025-06-09
Payer: OTHER GOVERNMENT

## 2025-06-09 VITALS
DIASTOLIC BLOOD PRESSURE: 62 MMHG | BODY MASS INDEX: 23.55 KG/M2 | WEIGHT: 159 LBS | HEART RATE: 68 BPM | OXYGEN SATURATION: 97 % | TEMPERATURE: 98.1 F | HEIGHT: 69 IN | SYSTOLIC BLOOD PRESSURE: 122 MMHG

## 2025-06-09 DIAGNOSIS — N89.8 VAGINAL DRYNESS: ICD-10-CM

## 2025-06-09 DIAGNOSIS — D75.89 MACROCYTOSIS: ICD-10-CM

## 2025-06-09 DIAGNOSIS — R76.8 ELEVATED ANTINUCLEAR ANTIBODY (ANA) LEVEL: ICD-10-CM

## 2025-06-09 DIAGNOSIS — E06.3 HASHIMOTO'S THYROIDITIS: Primary | ICD-10-CM

## 2025-06-09 DIAGNOSIS — M35.01 SJOGREN'S SYNDROME WITH KERATOCONJUNCTIVITIS SICCA: ICD-10-CM

## 2025-06-09 DIAGNOSIS — I73.00 RAYNAUD'S PHENOMENON WITHOUT GANGRENE: ICD-10-CM

## 2025-06-09 DIAGNOSIS — N30.20 CHRONIC CYSTITIS: ICD-10-CM

## 2025-06-09 DIAGNOSIS — G44.89 OTHER HEADACHE SYNDROME: ICD-10-CM

## 2025-06-09 DIAGNOSIS — M54.2 ANTERIOR NECK PAIN: ICD-10-CM

## 2025-06-09 DIAGNOSIS — N94.10 DYSPAREUNIA IN FEMALE: ICD-10-CM

## 2025-06-09 DIAGNOSIS — H53.8 BLURRED VISION: ICD-10-CM

## 2025-06-09 LAB
FOLATE SERPL-MCNC: 15.6 NG/ML (ref 4.78–24.2)
PROLACTIN SERPL-MCNC: 5.97 NG/ML (ref 4.79–23.3)
VIT B12 BLD-MCNC: 803 PG/ML (ref 211–946)

## 2025-06-09 PROCEDURE — 82607 VITAMIN B-12: CPT | Performed by: INTERNAL MEDICINE

## 2025-06-09 PROCEDURE — 36415 COLL VENOUS BLD VENIPUNCTURE: CPT

## 2025-06-09 PROCEDURE — 84146 ASSAY OF PROLACTIN: CPT | Performed by: INTERNAL MEDICINE

## 2025-06-09 PROCEDURE — 82746 ASSAY OF FOLIC ACID SERUM: CPT | Performed by: INTERNAL MEDICINE

## 2025-06-09 RX ORDER — BUTALBITAL, ACETAMINOPHEN AND CAFFEINE 50; 325; 40 MG/1; MG/1; MG/1
1 TABLET ORAL EVERY 6 HOURS PRN
Qty: 40 TABLET | Refills: 0 | Status: SHIPPED | OUTPATIENT
Start: 2025-06-09 | End: 2025-06-19

## 2025-06-09 NOTE — PROGRESS NOTES
"Chief Complaint  Establish Care (Swollen lump on neck) and Neck Pain    Subjective        Krystyna Cosme presents to [facility]  History of Present Illness  Fatigue , brain fog, 4 months, constipation, alt diarrhea. HA right side.  Patient has brought her records from other providers.  Patient has a positive LUCAS to 320.  Other rheumatological tests are negative.She has Raynaud's phenomena and show me pictures of her fingers turning white.  She has vaginal dryness and dyspareunia.  She has had her uterus removed.  She still has her ovaries.  She has headaches which seem to be central and they radiated over to her left temporal area sometimes she has visual disturbance with these.  She just overall does not feel like something is giving her energy.  She has Hashimoto's antibodies.  She had a history of drinking alcohol in the past.  But she is quit she has had a long history of cystitis and it continues.  She says her neck hurts over the thyroid area but sometimes it gets swollen and it is hurts to swallow.  She has not had an ultrasound.  Objective   Vital Signs:  /62 (BP Location: Right arm, Patient Position: Sitting, Cuff Size: Adult)   Pulse 68   Temp 98.1 °F (36.7 °C) (Infrared)   Ht 175.3 cm (69.02\")   Wt 72.1 kg (159 lb)   SpO2 97%   BMI 23.47 kg/m²   Estimated body mass index is 23.47 kg/m² as calculated from the following:    Height as of this encounter: 175.3 cm (69.02\").    Weight as of this encounter: 72.1 kg (159 lb).    BMI is within normal parameters. No other follow-up for BMI required.      Review of Systems   Constitutional:  Positive for activity change and fatigue.   HENT:  Positive for trouble swallowing. Negative for voice change.    Musculoskeletal:  Positive for myalgias.   All other systems reviewed and are negative.       Physical Exam  Vitals and nursing note reviewed.   Constitutional:       Appearance: Normal appearance.   HENT:      Head: " Normocephalic.      Nose: Nose normal.      Mouth/Throat:      Mouth: Mucous membranes are moist.   Eyes:      Extraocular Movements: Extraocular movements intact.      Conjunctiva/sclera: Conjunctivae normal.      Pupils: Pupils are equal, round, and reactive to light.   Neck:      Thyroid: Thyroid tenderness present.        Comments: Fullness to the thyroid area as diagrammed above.  Tender with palpation tender when she swallows feel the thyroid rise  Cardiovascular:      Rate and Rhythm: Regular rhythm.      Heart sounds: Normal heart sounds.   Pulmonary:      Effort: Pulmonary effort is normal.      Breath sounds: Normal breath sounds.   Abdominal:      Palpations: Abdomen is soft.      Tenderness: There is no abdominal tenderness.   Musculoskeletal:         General: No swelling.   Skin:     General: Skin is warm and dry.   Neurological:      General: No focal deficit present.      Mental Status: She is alert.   Psychiatric:         Mood and Affect: Mood normal.         Behavior: Behavior normal.        Result Review :      Data reviewed : previous records and labs          Assessment and Plan   Diagnoses and all orders for this visit:    1. Hashimoto's thyroiditis (Primary)  -     US Thyroid    2. Sjogren's syndrome with keratoconjunctivitis sicca    3. Elevated antinuclear antibody (LUCAS) level    4. Macrocytosis  -     Vitamin B12; Future  -     Folate; Future    5. Raynaud's phenomenon without gangrene    6. Chronic cystitis    7. Dyspareunia in female  -     Estrogens Conjugated (PREMARIN) 0.625 MG/GM vaginal cream; Insert  into the vagina Every Night for 30 days.  Dispense: 30 g; Refill: 3    8. Vaginal dryness  -     Estrogens Conjugated (PREMARIN) 0.625 MG/GM vaginal cream; Insert  into the vagina Every Night for 30 days.  Dispense: 30 g; Refill: 3    9. Other headache syndrome  -     Prolactin; Future  -     butalbital-acetaminophen-caffeine (Esgic) -40 MG per tablet; Take 1 tablet by mouth Every  6 (Six) Hours As Needed for Headache for up to 10 days.  Dispense: 40 tablet; Refill: 0    10. Blurred vision    11. Anterior neck pain    Thyroid pain.  Will get ultrasound of her thyroid.  Start her on some topical estrogen for dyspareunia and vaginal dryness that may help some with her cystitis.  Reviewed her records spent 10 minutes doing that looking over her lab work and past medical history send treatments.  Reviewed picture of her Raynaud's on her phone with her pictures of her fingers white.  Will give her some for headache we will check a prolactin for her headaches and also for any sort of autoimmune difficulties we may have to explore that further.  She has microcytosis we will get a B12 and folate.         Follow Up   Return in about 4 weeks (around 7/7/2025).  Patient was given instructions and counseling regarding her condition or for health maintenance advice. Please see specific information pulled into the AVS if appropriate.

## 2025-06-17 ENCOUNTER — HOSPITAL ENCOUNTER (OUTPATIENT)
Dept: ULTRASOUND IMAGING | Facility: HOSPITAL | Age: 45
Discharge: HOME | End: 2025-06-17
Admitting: INTERNAL MEDICINE
Payer: OTHER GOVERNMENT

## 2025-06-17 PROCEDURE — 76536 US EXAM OF HEAD AND NECK: CPT

## 2025-06-27 ENCOUNTER — TELEPHONE (OUTPATIENT)
Dept: FAMILY MEDICINE CLINIC | Facility: CLINIC | Age: 45
End: 2025-06-27
Payer: OTHER GOVERNMENT

## 2025-06-27 DIAGNOSIS — N89.8 VAGINAL DRYNESS: Primary | ICD-10-CM

## 2025-06-27 DIAGNOSIS — N94.10 DYSPAREUNIA IN FEMALE: ICD-10-CM

## 2025-06-27 RX ORDER — ESTRADIOL 0.1 MG/G
2 CREAM VAGINAL DAILY
Qty: 42.5 G | Refills: 1 | Status: SHIPPED | OUTPATIENT
Start: 2025-06-27 | End: 2025-08-26

## 2025-06-27 NOTE — TELEPHONE ENCOUNTER
I tried to do a Prior Auth for Premarin, pt insurance is asking if can change to one on their formulary:   estradiol vaginal cream, Imvexxy, Vagifem

## 2025-07-09 ENCOUNTER — OFFICE VISIT (OUTPATIENT)
Dept: FAMILY MEDICINE CLINIC | Facility: CLINIC | Age: 45
End: 2025-07-09
Payer: OTHER GOVERNMENT

## 2025-07-09 VITALS
SYSTOLIC BLOOD PRESSURE: 102 MMHG | OXYGEN SATURATION: 99 % | HEART RATE: 67 BPM | TEMPERATURE: 98.1 F | DIASTOLIC BLOOD PRESSURE: 60 MMHG | WEIGHT: 154 LBS | BODY MASS INDEX: 22.81 KG/M2 | HEIGHT: 69 IN

## 2025-07-09 DIAGNOSIS — I73.00 RAYNAUD'S PHENOMENON WITHOUT GANGRENE: ICD-10-CM

## 2025-07-09 DIAGNOSIS — I95.9 SYMPTOMATIC HYPOTENSION: Primary | ICD-10-CM

## 2025-07-09 DIAGNOSIS — R76.8 ELEVATED RHEUMATOID FACTOR: ICD-10-CM

## 2025-07-09 PROBLEM — E06.3 AUTOIMMUNE THYROIDITIS: Status: ACTIVE | Noted: 2025-07-09

## 2025-07-09 RX ORDER — HYDROXYZINE HYDROCHLORIDE 25 MG/1
25 TABLET, FILM COATED ORAL EVERY 6 HOURS PRN
COMMUNITY
Start: 2025-07-03

## 2025-07-09 RX ORDER — SULFASALAZINE 500 MG/1
500 TABLET ORAL 4 TIMES DAILY
Qty: 120 TABLET | Refills: 0 | Status: SHIPPED | OUTPATIENT
Start: 2025-07-09 | End: 2025-08-08

## 2025-07-09 RX ORDER — MIDODRINE HYDROCHLORIDE 5 MG/1
5 TABLET ORAL
Qty: 90 TABLET | Refills: 0 | Status: SHIPPED | OUTPATIENT
Start: 2025-07-09 | End: 2025-08-08

## 2025-07-09 NOTE — PROGRESS NOTES
"Chief Complaint  Follow-up    Subjective        Krystyna Cosme presents to [facility]  History of Present Illness  Recheck, B12 helps, Mowed grass Monday.  Back from [facility], more fatigued.Brain fog. Symptomatic hypotension, has neurocardiagenic syncope. Syncope with blood draws.  Patient was in .  Objective   Vital Signs:  /60 (BP Location: Right arm, Patient Position: Sitting, Cuff Size: Adult)   Pulse 67   Temp 98.1 °F (36.7 °C) (Infrared)   Ht 175.3 cm (69.02\")   Wt 69.9 kg (154 lb)   SpO2 99%   BMI 22.73 kg/m²   Estimated body mass index is 22.73 kg/m² as calculated from the following:    Height as of this encounter: 175.3 cm (69.02\").    Weight as of this encounter: 69.9 kg (154 lb).    BMI is within normal parameters. No other follow-up for BMI required.      Review of Systems   Respiratory:  Negative for chest tightness.    Cardiovascular:  Negative for chest pain, palpitations and leg swelling.   Neurological:  Positive for syncope and light-headedness. Negative for dizziness, seizures, facial asymmetry, speech difficulty, weakness, numbness and headaches.   All other systems reviewed and are negative.       Physical Exam  Vitals and nursing note reviewed.   Constitutional:       Appearance: Normal appearance.   HENT:      Head: Normocephalic.      Right Ear: External ear normal.      Left Ear: External ear normal.      Nose: Nose normal.      Mouth/Throat:      Mouth: Mucous membranes are moist.      Pharynx: Oropharynx is clear.   Eyes:      Extraocular Movements: Extraocular movements intact.      Conjunctiva/sclera: Conjunctivae normal.      Pupils: Pupils are equal, round, and reactive to light.   Cardiovascular:      Rate and Rhythm: Regular rhythm.      Heart sounds: Normal heart sounds.   Pulmonary:      Effort: Pulmonary effort is normal.      Breath sounds: Normal breath sounds.   Abdominal:      Palpations: Abdomen is soft. "   Musculoskeletal:         General: No swelling.      Cervical back: Neck supple.   Skin:     General: Skin is warm and dry.   Neurological:      General: No focal deficit present.      Mental Status: She is alert.   Psychiatric:         Mood and Affect: Mood normal.         Behavior: Behavior normal.        Result Review :      Data reviewed : last visit.           Assessment and Plan   Diagnoses and all orders for this visit:    1. Symptomatic hypotension (Primary)  -     Salivary Cortisol, MS - Saliva, Oral Cavity; Future  -     midodrine (PROAMATINE) 5 MG tablet; Take 1 tablet by mouth 3 (Three) Times a Day Before Meals for 30 days.  Dispense: 90 tablet; Refill: 0    2. Raynaud's phenomenon without gangrene  -     sulfaSALAzine (Azulfidine) 500 MG tablet; Take 1 tablet by mouth 4 (Four) Times a Day for 30 days.  Dispense: 120 tablet; Refill: 0    3. Elevated rheumatoid factor  -     sulfaSALAzine (Azulfidine) 500 MG tablet; Take 1 tablet by mouth 4 (Four) Times a Day for 30 days.  Dispense: 120 tablet; Refill: 0    Other orders  -     Cancel: Mammo Screening Digital Tomosynthesis Bilateral With CAD; Future      Will try elevated rheumatoid factor with sulfasalazine.  Patient has seen rheumatology.  Will get a salivary cortisol to make sure there is not a deficiency start some midodrine as needed for symptomatic hypotension neurocardiogenic syncope talked about Zoloft.  Will decide on that in 4 weeks when we see her again and see how she is doing with the medications and see what her test results come back.       Follow Up   Return in about 4 weeks (around 8/6/2025).  Patient was given instructions and counseling regarding her condition or for health maintenance advice. Please see specific information pulled into the AVS if appropriate.